# Patient Record
Sex: FEMALE | Race: WHITE | NOT HISPANIC OR LATINO | Employment: OTHER | URBAN - NONMETROPOLITAN AREA
[De-identification: names, ages, dates, MRNs, and addresses within clinical notes are randomized per-mention and may not be internally consistent; named-entity substitution may affect disease eponyms.]

---

## 2022-03-02 ENCOUNTER — LAB (OUTPATIENT)
Dept: LAB | Facility: HOSPITAL | Age: 62
End: 2022-03-02

## 2022-03-02 ENCOUNTER — OFFICE VISIT (OUTPATIENT)
Dept: INTERNAL MEDICINE | Facility: CLINIC | Age: 62
End: 2022-03-02

## 2022-03-02 VITALS
WEIGHT: 148.3 LBS | OXYGEN SATURATION: 98 % | HEART RATE: 72 BPM | SYSTOLIC BLOOD PRESSURE: 130 MMHG | TEMPERATURE: 97.4 F | BODY MASS INDEX: 26.28 KG/M2 | HEIGHT: 63 IN | DIASTOLIC BLOOD PRESSURE: 62 MMHG | RESPIRATION RATE: 16 BRPM

## 2022-03-02 DIAGNOSIS — I10 ESSENTIAL HYPERTENSION: Primary | ICD-10-CM

## 2022-03-02 DIAGNOSIS — Z00.00 HEALTHCARE MAINTENANCE: ICD-10-CM

## 2022-03-02 DIAGNOSIS — Z11.59 ENCOUNTER FOR HEPATITIS C SCREENING TEST FOR LOW RISK PATIENT: ICD-10-CM

## 2022-03-02 DIAGNOSIS — I10 ESSENTIAL HYPERTENSION: ICD-10-CM

## 2022-03-02 DIAGNOSIS — E78.5 HYPERLIPIDEMIA, UNSPECIFIED HYPERLIPIDEMIA TYPE: ICD-10-CM

## 2022-03-02 LAB
ALBUMIN SERPL-MCNC: 4.5 G/DL (ref 3.5–5.2)
ALBUMIN/GLOB SERPL: 2 G/DL
ALP SERPL-CCNC: 83 U/L (ref 39–117)
ALT SERPL W P-5'-P-CCNC: 15 U/L (ref 1–33)
ANION GAP SERPL CALCULATED.3IONS-SCNC: 9.4 MMOL/L (ref 5–15)
AST SERPL-CCNC: 22 U/L (ref 1–32)
BILIRUB SERPL-MCNC: 0.4 MG/DL (ref 0–1.2)
BUN SERPL-MCNC: 13 MG/DL (ref 8–23)
BUN/CREAT SERPL: 13.4 (ref 7–25)
CALCIUM SPEC-SCNC: 9.4 MG/DL (ref 8.6–10.5)
CHLORIDE SERPL-SCNC: 104 MMOL/L (ref 98–107)
CHOLEST SERPL-MCNC: 265 MG/DL (ref 0–200)
CO2 SERPL-SCNC: 27.6 MMOL/L (ref 22–29)
CREAT SERPL-MCNC: 0.97 MG/DL (ref 0.57–1)
DEPRECATED RDW RBC AUTO: 46.1 FL (ref 37–54)
EGFRCR SERPLBLD CKD-EPI 2021: 65.8 ML/MIN/1.73
ERYTHROCYTE [DISTWIDTH] IN BLOOD BY AUTOMATED COUNT: 13.1 % (ref 12.3–15.4)
GLOBULIN UR ELPH-MCNC: 2.3 GM/DL
GLUCOSE SERPL-MCNC: 109 MG/DL (ref 65–99)
HBA1C MFR BLD: 5.1 % (ref 4.8–5.6)
HCT VFR BLD AUTO: 38.9 % (ref 34–46.6)
HCV AB SER DONR QL: NORMAL
HDLC SERPL-MCNC: 85 MG/DL (ref 40–60)
HGB BLD-MCNC: 12.7 G/DL (ref 12–15.9)
LDLC SERPL CALC-MCNC: 156 MG/DL (ref 0–100)
LDLC/HDLC SERPL: 1.79 {RATIO}
MCH RBC QN AUTO: 31.3 PG (ref 26.6–33)
MCHC RBC AUTO-ENTMCNC: 32.6 G/DL (ref 31.5–35.7)
MCV RBC AUTO: 95.8 FL (ref 79–97)
PLATELET # BLD AUTO: 193 10*3/MM3 (ref 140–450)
PMV BLD AUTO: 11.5 FL (ref 6–12)
POTASSIUM SERPL-SCNC: 4.6 MMOL/L (ref 3.5–5.2)
PROT SERPL-MCNC: 6.8 G/DL (ref 6–8.5)
RBC # BLD AUTO: 4.06 10*6/MM3 (ref 3.77–5.28)
SODIUM SERPL-SCNC: 141 MMOL/L (ref 136–145)
TRIGL SERPL-MCNC: 138 MG/DL (ref 0–150)
VLDLC SERPL-MCNC: 24 MG/DL (ref 5–40)
WBC NRBC COR # BLD: 3.92 10*3/MM3 (ref 3.4–10.8)

## 2022-03-02 PROCEDURE — 85027 COMPLETE CBC AUTOMATED: CPT

## 2022-03-02 PROCEDURE — 86803 HEPATITIS C AB TEST: CPT

## 2022-03-02 PROCEDURE — 83036 HEMOGLOBIN GLYCOSYLATED A1C: CPT

## 2022-03-02 PROCEDURE — 99204 OFFICE O/P NEW MOD 45 MIN: CPT | Performed by: INTERNAL MEDICINE

## 2022-03-02 PROCEDURE — 80053 COMPREHEN METABOLIC PANEL: CPT

## 2022-03-02 PROCEDURE — 80061 LIPID PANEL: CPT

## 2022-03-02 PROCEDURE — 36415 COLL VENOUS BLD VENIPUNCTURE: CPT

## 2022-03-02 RX ORDER — LISINOPRIL 10 MG/1
10 TABLET ORAL 2 TIMES DAILY
COMMUNITY

## 2022-03-02 RX ORDER — EZETIMIBE 10 MG/1
10 TABLET ORAL DAILY
COMMUNITY
End: 2022-07-28 | Stop reason: SDUPTHER

## 2022-03-02 RX ORDER — IBUPROFEN 200 MG
600 TABLET ORAL AS NEEDED
COMMUNITY

## 2022-03-02 RX ORDER — CARVEDILOL 25 MG/1
12.5 TABLET ORAL 2 TIMES DAILY
COMMUNITY

## 2022-03-02 NOTE — PATIENT INSTRUCTIONS
"https://www.nhlbi.nih.gov/files/docs/public/heart/dash_brief.pdf\">   DASH Eating Plan  DASH stands for Dietary Approaches to Stop Hypertension. The DASH eating plan is a healthy eating plan that has been shown to:  · Reduce high blood pressure (hypertension).  · Reduce your risk for type 2 diabetes, heart disease, and stroke.  · Help with weight loss.  What are tips for following this plan?  Reading food labels  · Check food labels for the amount of salt (sodium) per serving. Choose foods with less than 5 percent of the Daily Value of sodium. Generally, foods with less than 300 milligrams (mg) of sodium per serving fit into this eating plan.  · To find whole grains, look for the word \"whole\" as the first word in the ingredient list.  Shopping  · Buy products labeled as \"low-sodium\" or \"no salt added.\"  · Buy fresh foods. Avoid canned foods and pre-made or frozen meals.  Cooking  · Avoid adding salt when cooking. Use salt-free seasonings or herbs instead of table salt or sea salt. Check with your health care provider or pharmacist before using salt substitutes.  · Do not rivera foods. Cook foods using healthy methods such as baking, boiling, grilling, roasting, and broiling instead.  · Cook with heart-healthy oils, such as olive, canola, avocado, soybean, or sunflower oil.  Meal planning    · Eat a balanced diet that includes:  ? 4 or more servings of fruits and 4 or more servings of vegetables each day. Try to fill one-half of your plate with fruits and vegetables.  ? 6-8 servings of whole grains each day.  ? Less than 6 oz (170 g) of lean meat, poultry, or fish each day. A 3-oz (85-g) serving of meat is about the same size as a deck of cards. One egg equals 1 oz (28 g).  ? 2-3 servings of low-fat dairy each day. One serving is 1 cup (237 mL).  ? 1 serving of nuts, seeds, or beans 5 times each week.  ? 2-3 servings of heart-healthy fats. Healthy fats called omega-3 fatty acids are found in foods such as walnuts, " flaxseeds, fortified milks, and eggs. These fats are also found in cold-water fish, such as sardines, salmon, and mackerel.  · Limit how much you eat of:  ? Canned or prepackaged foods.  ? Food that is high in trans fat, such as some fried foods.  ? Food that is high in saturated fat, such as fatty meat.  ? Desserts and other sweets, sugary drinks, and other foods with added sugar.  ? Full-fat dairy products.  · Do not salt foods before eating.  · Do not eat more than 4 egg yolks a week.  · Try to eat at least 2 vegetarian meals a week.  · Eat more home-cooked food and less restaurant, buffet, and fast food.    Lifestyle  · When eating at a restaurant, ask that your food be prepared with less salt or no salt, if possible.  · If you drink alcohol:  ? Limit how much you use to:  § 0-1 drink a day for women who are not pregnant.  § 0-2 drinks a day for men.  ? Be aware of how much alcohol is in your drink. In the [place] one drink equals one 12 oz bottle of beer (355 mL), one 5 oz glass of wine (148 mL), or one 1½ oz glass of hard liquor (44 mL).  General information  · Avoid eating more than 2,300 mg of salt a day. If you have hypertension, you may need to reduce your sodium intake to 1,500 mg a day.  · Work with your health care provider to maintain a healthy body weight or to lose weight. Ask what an ideal weight is for you.  · Get at least 30 minutes of exercise that causes your heart to beat faster (aerobic exercise) most days of the week. Activities may include walking, swimming, or biking.  · Work with your health care provider or dietitian to adjust your eating plan to your individual calorie needs.  What foods should I eat?  Fruits  All fresh, dried, or frozen fruit. Canned fruit in natural juice (without added sugar).  Vegetables  Fresh or frozen vegetables (raw, steamed, roasted, or grilled). Low-sodium or reduced-sodium tomato and vegetable juice. Low-sodium or reduced-sodium tomato sauce and tomato paste.  Low-sodium or reduced-sodium canned vegetables.  Grains  Whole-grain or whole-wheat bread. Whole-grain or whole-wheat pasta. Brown rice. Oatmeal. Quinoa. Bulgur. Whole-grain and low-sodium cereals. Silvia bread. Low-fat, low-sodium crackers. Whole-wheat flour tortillas.  Meats and other proteins  Skinless chicken or turkey. Ground chicken or turkey. Pork with fat trimmed off. Fish and seafood. Egg whites. Dried beans, peas, or lentils. Unsalted nuts, nut butters, and seeds. Unsalted canned beans. Lean cuts of beef with fat trimmed off. Low-sodium, lean precooked or cured meat, such as sausages or meat loaves.  Dairy  Low-fat (1%) or fat-free (skim) milk. Reduced-fat, low-fat, or fat-free cheeses. Nonfat, low-sodium ricotta or cottage cheese. Low-fat or nonfat yogurt. Low-fat, low-sodium cheese.  Fats and oils  Soft margarine without trans fats. Vegetable oil. Reduced-fat, low-fat, or light mayonnaise and salad dressings (reduced-sodium). Canola, safflower, olive, avocado, soybean, and sunflower oils. Avocado.  Seasonings and condiments  Herbs. Spices. Seasoning mixes without salt.  Other foods  Unsalted popcorn and pretzels. Fat-free sweets.  The items listed above may not be a complete list of foods and beverages you can eat. Contact a dietitian for more information.  What foods should I avoid?  Fruits  Canned fruit in a light or heavy syrup. Fried fruit. Fruit in cream or butter sauce.  Vegetables  Creamed or fried vegetables. Vegetables in a cheese sauce. Regular canned vegetables (not low-sodium or reduced-sodium). Regular canned tomato sauce and paste (not low-sodium or reduced-sodium). Regular tomato and vegetable juice (not low-sodium or reduced-sodium). Pickles. Olives.  Grains  Baked goods made with fat, such as croissants, muffins, or some breads. Dry pasta or rice meal packs.  Meats and other proteins  Fatty cuts of meat. Ribs. Fried meat. Sanchez. Bologna, salami, and other precooked or cured meats, such as  sausages or meat loaves. Fat from the back of a pig (fatback). Bratwurst. Salted nuts and seeds. Canned beans with added salt. Canned or smoked fish. Whole eggs or egg yolks. Chicken or turkey with skin.  Dairy  Whole or 2% milk, cream, and half-and-half. Whole or full-fat cream cheese. Whole-fat or sweetened yogurt. Full-fat cheese. Nondairy creamers. Whipped toppings. Processed cheese and cheese spreads.  Fats and oils  Butter. Stick margarine. Lard. Shortening. Ghee. Sanchez fat. Tropical oils, such as coconut, palm kernel, or palm oil.  Seasonings and condiments  Onion salt, garlic salt, seasoned salt, table salt, and sea salt. Worcestershire sauce. Tartar sauce. Barbecue sauce. Teriyaki sauce. Soy sauce, including reduced-sodium. Steak sauce. Canned and packaged gravies. Fish sauce. Oyster sauce. Cocktail sauce. Store-bought horseradish. Ketchup. Mustard. Meat flavorings and tenderizers. Bouillon cubes. Hot sauces. Pre-made or packaged marinades. Pre-made or packaged taco seasonings. Relishes. Regular salad dressings.  Other foods  Salted popcorn and pretzels.  The items listed above may not be a complete list of foods and beverages you should avoid. Contact a dietitian for more information.  Where to find more information  · National Heart, Lung, and Blood Gay: www.nhlbi.nih.gov  · American Heart Association: www.heart.org  · Academy of Nutrition and Dietetics: www.eatright.org  · National Kidney Foundation: www.kidney.org  Summary  · The DASH eating plan is a healthy eating plan that has been shown to reduce high blood pressure (hypertension). It may also reduce your risk for type 2 diabetes, heart disease, and stroke.  · When on the DASH eating plan, aim to eat more fresh fruits and vegetables, whole grains, lean proteins, low-fat dairy, and heart-healthy fats.  · With the DASH eating plan, you should limit salt (sodium) intake to 2,300 mg a day. If you have hypertension, you may need to reduce your  sodium intake to 1,500 mg a day.  · Work with your health care provider or dietitian to adjust your eating plan to your individual calorie needs.  This information is not intended to replace advice given to you by your health care provider. Make sure you discuss any questions you have with your health care provider.  Document Revised: 11/20/2020 Document Reviewed: 11/20/2020  [employer] Patient Education © 2021 [employer]    Cooking With Less Salt  Cooking with less salt is one way to reduce the amount of sodium you get from food. Sodium is one of the elements that make up salt. It is found naturally in foods and is also added to certain foods. Depending on your condition and overall health, your health care provider or dietitian may recommend that you reduce your sodium intake. Most people should have less than 2,300 milligrams (mg) of sodium each day. If you have high blood pressure (hypertension), you may need to limit your sodium to 1,500 mg each day. Follow the tips below to help reduce your sodium intake.  What are tips for eating less sodium?  Reading food labels    · Check the food label before buying or using packaged ingredients. Always check the label for the serving size and sodium content.  · Look for products with no more than 140 mg of sodium in one serving.  · Check the % Daily Value column to see what percent of the daily recommended amount of sodium is provided in one serving of the product. Foods with 5% or less in this column are considered low in sodium. Foods with 20% or higher are considered high in sodium.  · Do not choose foods with salt as one of the first three ingredients on the ingredients list. If salt is one of the first three ingredients, it usually means the item is high in sodium.    Shopping  · Buy sodium-free or low-sodium products. Look for the following words on food labels:  ? Low-sodium.  ? Sodium-free.  ? Reduced-sodium.  ? No salt added.  ? Unsalted.  · Always check the  "sodium content even if foods are labeled as low-sodium or no salt added.  · Buy fresh foods.  Cooking  · Use herbs, seasonings without salt, and spices as substitutes for salt.  · Use sodium-free baking soda when baking.  · Haysi, braise, or roast foods to add flavor with less salt.  · Avoid adding salt to pasta, rice, or hot cereals.  · Drain and rinse canned vegetables, beans, and meat before use.  · Avoid adding salt when cooking sweets and desserts.  · Cook with low-sodium ingredients.  What foods are high in sodium?  Vegetables  Regular canned vegetables (not low-sodium or reduced-sodium). Sauerkraut, pickled vegetables, and relishes. Olives. French fries. Onion rings. Regular canned tomato sauce and paste. Regular tomato and vegetable juice. Frozen vegetables in sauces.  Grains  Instant hot cereals. Bread stuffing, pancake, and biscuit mixes. Croutons. Seasoned rice or pasta mixes. Noodle soup cups. Boxed or frozen macaroni and cheese. Regular salted crackers. Self-rising flour. Rolls. Bagels. Flour tortillas and wraps.  Meats and other proteins  Meat or fish that is salted, canned, smoked, cured, spiced, or pickled. This includes soliz, ham, sausages, hot dogs, corned beef, chipped beef, meat loaves, salt pork, jerky, pickled herring, anchovies, regular canned tuna, and sardines. Salted nuts.  Dairy  Processed cheese and cheese spreads. Cheese curds. Blue cheese. Feta cheese. String cheese. Regular cottage cheese. Buttermilk. Canned milk.  The items listed above may not be a complete list of foods high in sodium. Actual amounts of sodium may be different depending on processing. Contact a dietitian for more information.  What foods are low in sodium?  Fruits  Fresh, frozen, or canned fruit with no sauce added. Fruit juice.  Vegetables  Fresh or frozen vegetables with no sauce added. \"No salt added\" canned vegetables. \"No salt added\" tomato sauce and paste. Low-sodium or reduced-sodium tomato and vegetable " juice.  Grains  Noodles, pasta, quinoa, rice. Shredded or puffed wheat or puffed rice. Regular or quick oats (not instant). Low-sodium crackers. Low-sodium bread. Whole-grain bread and whole-grain pasta. Unsalted popcorn.  Meats and other proteins  Fresh or frozen whole meats, poultry (not injected with sodium), and fish with no sauce added. Unsalted nuts. Dried peas, beans, and lentils without added salt. Unsalted canned beans. Eggs. Unsalted nut butters. Low-sodium canned tuna or chicken.  Dairy  Milk. Soy milk. Yogurt. Low-sodium cheeses, such as Swiss, [place], mozzarella, and ricotta. Sherbet or ice cream (keep to ½ cup per serving). Cream cheese.  Fats and oils  Unsalted butter or margarine.  Other foods  Homemade pudding. Sodium-free baking soda and baking powder. Herbs and spices. Low-sodium seasoning mixes.  Beverages  Coffee and tea. Carbonated beverages.  The items listed above may not be a complete list of foods low in sodium. Actual amounts of sodium may be different depending on processing. Contact a dietitian for more information.  What are some salt alternatives when cooking?  The following are herbs, seasonings, and spices that can be used instead of salt to flavor your food. Herbs should be fresh or dried. Do not choose packaged mixes. Next to the name of the herb, spice, or seasoning are some examples of foods you can pair it with.  Herbs  · Bay leaves - Soups, meat and vegetable dishes, and spaghetti sauce.  · Basil - Italian dishes, soups, pasta, and fish dishes.  · Cilantro - Meat, poultry, and vegetable dishes.  · Chili powder - Marinades and Mexican dishes.  · Chives - Salad dressings and potato dishes.  · Cumin - Mexican dishes, couscous, and meat dishes.  · Dill - Fish dishes, sauces, and salads.  · Fennel - Meat and vegetable dishes, breads, and cookies.  · Garlic (do not use garlic salt) - Italian dishes, meat dishes, salad dressings, and sauces.  · Marjoram - Soups, potato dishes,  and meat dishes.  · Oregano - Pizza and spaghetti sauce.  · Parsley - Salads, soups, pasta, and meat dishes.  · Kelly - Italian dishes, salad dressings, soups, and red meats.  · Saffron - Fish dishes, pasta, and some poultry dishes.  · Bharath - Stuffings and sauces.  · Tarragon - Fish and poultry dishes.  · Thyme - Stuffing, meat, and fish dishes.  Seasonings  · Lemon juice - Fish dishes, poultry dishes, vegetables, and salads.  · Vinegar - Salad dressings, vegetables, and fish dishes.  Spices  · Cinnamon - Sweet dishes, such as cakes, cookies, and puddings.  · Cloves - Gingerbread, puddings, and marinades for meats.  · Rosales - Vegetable dishes, fish and poultry dishes, and stir-rivera dishes.  · Neda - Vegetable dishes, fish dishes, and stir-rivera dishes.  · Nutmeg - Pasta, vegetables, poultry, fish dishes, and custard.  Summary  · Cooking with less salt is one way to reduce the amount of sodium that you get from food.  · Buy sodium-free or low-sodium products.  · Check the food label before using or buying packaged ingredients.  · Use herbs, seasonings without salt, and spices as substitutes for salt in foods.  This information is not intended to replace advice given to you by your health care provider. Make sure you discuss any questions you have with your health care provider.  Document Revised: 12/09/2020 Document Reviewed: 12/09/2020  [employer] Patient Education © 2021 [employer]    Hypertension, Adult  High blood pressure (hypertension) is when the force of blood pumping through the arteries is too strong. The arteries are the blood vessels that carry blood from the heart throughout the body. Hypertension forces the heart to work harder to pump blood and may cause arteries to become narrow or stiff. Untreated or uncontrolled hypertension can cause a heart attack, heart failure, a stroke, kidney disease, and other problems.  A blood pressure reading consists of a higher number over a lower number. Ideally,  "your blood pressure should be below 120/80. The first (\"top\") number is called the systolic pressure. It is a measure of the pressure in your arteries as your heart beats. The second (\"bottom\") number is called the diastolic pressure. It is a measure of the pressure in your arteries as the heart relaxes.  What are the causes?  The exact cause of this condition is not known. There are some conditions that result in or are related to high blood pressure.  What increases the risk?  Some risk factors for high blood pressure are under your control. The following factors may make you more likely to develop this condition:  · Smoking.  · Having type 2 diabetes mellitus, high cholesterol, or both.  · Not getting enough exercise or physical activity.  · Being overweight.  · Having too much fat, sugar, calories, or salt (sodium) in your diet.  · Drinking too much alcohol.  Some risk factors for high blood pressure may be difficult or impossible to change. Some of these factors include:  · Having chronic kidney disease.  · Having a family history of high blood pressure.  · Age. Risk increases with age.  · Race. You may be at higher risk if you are .  · Gender. Men are at higher risk than women before age 45. After age 65, women are at higher risk than men.  · Having obstructive sleep apnea.  · Stress.  What are the signs or symptoms?  High blood pressure may not cause symptoms. Very high blood pressure (hypertensive crisis) may cause:  · Headache.  · Anxiety.  · Shortness of breath.  · Nosebleed.  · Nausea and vomiting.  · Vision changes.  · Severe chest pain.  · Seizures.  How is this diagnosed?  This condition is diagnosed by measuring your blood pressure while you are seated, with your arm resting on a flat surface, your legs uncrossed, and your feet flat on the floor. The cuff of the blood pressure monitor will be placed directly against the skin of your upper arm at the level of your heart. It should be " measured at least twice using the same arm. Certain conditions can cause a difference in blood pressure between your right and left arms.  Certain factors can cause blood pressure readings to be lower or higher than normal for a short period of time:  · When your blood pressure is higher when you are in a health care provider's office than when you are at home, this is called white coat hypertension. Most people with this condition do not need medicines.  · When your blood pressure is higher at home than when you are in a health care provider's office, this is called masked hypertension. Most people with this condition may need medicines to control blood pressure.  If you have a high blood pressure reading during one visit or you have normal blood pressure with other risk factors, you may be asked to:  · Return on a different day to have your blood pressure checked again.  · Monitor your blood pressure at home for 1 week or longer.  If you are diagnosed with hypertension, you may have other blood or imaging tests to help your health care provider understand your overall risk for other conditions.  How is this treated?  This condition is treated by making healthy lifestyle changes, such as eating healthy foods, exercising more, and reducing your alcohol intake. Your health care provider may prescribe medicine if lifestyle changes are not enough to get your blood pressure under control, and if:  · Your systolic blood pressure is above 130.  · Your diastolic blood pressure is above 80.  Your personal target blood pressure may vary depending on your medical conditions, your age, and other factors.  Follow these instructions at home:  Eating and drinking    · Eat a diet that is high in fiber and potassium, and low in sodium, added sugar, and fat. An example eating plan is called the DASH (Dietary Approaches to Stop Hypertension) diet. To eat this way:  ? Eat plenty of fresh fruits and vegetables. Try to fill one half of  your plate at each meal with fruits and vegetables.  ? Eat whole grains, such as whole-wheat pasta, brown rice, or whole-grain bread. Fill about one fourth of your plate with whole grains.  ? Eat or drink low-fat dairy products, such as skim milk or low-fat yogurt.  ? Avoid fatty cuts of meat, processed or cured meats, and poultry with skin. Fill about one fourth of your plate with lean proteins, such as fish, chicken without skin, beans, eggs, or tofu.  ? Avoid pre-made and processed foods. These tend to be higher in sodium, added sugar, and fat.  · Reduce your daily sodium intake. Most people with hypertension should eat less than 1,500 mg of sodium a day.  · Do not drink alcohol if:  ? Your health care provider tells you not to drink.  ? You are pregnant, may be pregnant, or are planning to become pregnant.  · If you drink alcohol:  ? Limit how much you use to:  § 0-1 drink a day for women.  § 0-2 drinks a day for men.  ? Be aware of how much alcohol is in your drink. In the [place] one drink equals one 12 oz bottle of beer (355 mL), one 5 oz glass of wine (148 mL), or one 1½ oz glass of hard liquor (44 mL).    Lifestyle    · Work with your health care provider to maintain a healthy body weight or to lose weight. Ask what an ideal weight is for you.  · Get at least 30 minutes of exercise most days of the week. Activities may include walking, swimming, or biking.  · Include exercise to strengthen your muscles (resistance exercise), such as Pilates or lifting weights, as part of your weekly exercise routine. Try to do these types of exercises for 30 minutes at least 3 days a week.  · Do not use any products that contain nicotine or tobacco, such as cigarettes, e-cigarettes, and chewing tobacco. If you need help quitting, ask your health care provider.  · Monitor your blood pressure at home as told by your health care provider.  · Keep all follow-up visits as told by your health care provider. This is  important.    Medicines  · Take over-the-counter and prescription medicines only as told by your health care provider. Follow directions carefully. Blood pressure medicines must be taken as prescribed.  · Do not skip doses of blood pressure medicine. Doing this puts you at risk for problems and can make the medicine less effective.  · Ask your health care provider about side effects or reactions to medicines that you should watch for.  Contact a health care provider if you:  · Think you are having a reaction to a medicine you are taking.  · Have headaches that keep coming back (recurring).  · Feel dizzy.  · Have swelling in your ankles.  · Have trouble with your vision.  Get help right away if you:  · Develop a severe headache or confusion.  · Have unusual weakness or numbness.  · Feel faint.  · Have severe pain in your chest or abdomen.  · Vomit repeatedly.  · Have trouble breathing.  Summary  · Hypertension is when the force of blood pumping through your arteries is too strong. If this condition is not controlled, it may put you at risk for serious complications.  · Your personal target blood pressure may vary depending on your medical conditions, your age, and other factors. For most people, a normal blood pressure is less than 120/80.  · Hypertension is treated with lifestyle changes, medicines, or a combination of both. Lifestyle changes include losing weight, eating a healthy, low-sodium diet, exercising more, and limiting alcohol.  This information is not intended to replace advice given to you by your health care provider. Make sure you discuss any questions you have with your health care provider.  Document Revised: 08/28/2019 Document Reviewed: 08/28/2019  [employer] Patient Education © 2021 [employer]

## 2022-03-02 NOTE — PROGRESS NOTES
Chief Complaint   Patient presents with   • Establish Care   • Hypertension   • Referral     Cardiology         History:  Melony Rincon is a 61 y.o. female who presents today for evaluation of the above problems.    She presents today to establish care.  She has past medical history for hypertension, hyperlipidemia, reflux, coronary artery disease among others.    She saw her primary care doctor last in August and is overdue for some blood work.    She takes Zetia for hyperlipidemia, and is on lisinopril and carvedilol for hypertension.    She has had issues with controlling her blood pressure and was referred to cardiology in [place] for hypertension.  She has had spikes of her blood pressure up to 180 systolic.  She then saw cardiology and had an echocardiogram on October 20, 2020 which reviewed.  She continues with difficulty controlling her blood pressure all the time.  She is currently on lisinopril 20 mg in the morning and 10 mg at night.  She developed hypotension with Coreg 25 mg twice a day.  She then cut back her Coreg to 12.5 mg twice a day, but her blood pressure increased with this regimen.  She is now on Coreg 25 mg in the morning and 12.5 mg in the evening.    She was previously on Norvasc.  Hydrochlorothiazide caused her leg cramps to worsen    She does report craving salty food, specifically chips.    She had a lung CT last fall showing mild COPD.  She had pulmonary function tests 3 to 4 years ago and it is reported only mild abnormalities.    Her last mammogram was January 2021.  She has got some issues that she wants to discuss with gynecology and a referral was placed today    Her last colonoscopy was April 2021 by Dr. Salmeron at [facility] in [facility] and we will try to get records.    Her previous PCP was Dr. Roddy Ricks and we will also try to get records.  His office is in [facility] and he is associated with [facility].  Michi KENT   HPI      ROS:  Review of Systems  "  Constitutional: Negative.    Respiratory: Positive for cough (intermittent dry). Negative for shortness of breath.    Cardiovascular: Negative.    Gastrointestinal: Negative.          Current Outpatient Medications:   •  carvedilol (COREG) 25 MG tablet, Take 25 mg by mouth., Disp: , Rfl:   •  ezetimibe (ZETIA) 10 MG tablet, Take 10 mg by mouth Daily., Disp: , Rfl:   •  ibuprofen (ADVIL,MOTRIN) 200 MG tablet, Take 600 mg by mouth As Needed for Mild Pain ., Disp: , Rfl:   •  LANSOPRAZOLE PO, Take 1 tablet by mouth Daily., Disp: , Rfl:   •  lisinopril (PRINIVIL,ZESTRIL) 10 MG tablet, Take 10 mg by mouth., Disp: , Rfl:     No results found for: GLUCOSE, BUN, CREATININE, EGFRIFNONA, EGFRIFAFRI, BCR, K, CO2, CALCIUM, PROTENTOTREF, ALBUMIN, LABIL2, BILIRUBIN, AST, ALT    No results found for: WBC, RBC, HGB, HCT, MCV, MCH, MCHC, RDW, RDWSD, MPV, PLT, NEUTRORELPCT, LYMPHORELPCT, MONORELPCT, EOSRELPCT, BASORELPCT, AUTOIGPER, NEUTROABS, LYMPHSABS, MONOSABS, EOSABS, BASOSABS, AUTOIGNUM, NRBC      OBJECTIVE:  Visit Vitals  /62 (BP Location: Left arm, Patient Position: Sitting, Cuff Size: Adult)   Pulse 72   Temp 97.4 °F (36.3 °C) (Oral)   Resp 16   Ht 160 cm (63\")   Wt 67.3 kg (148 lb 4.8 oz)   SpO2 98%   BMI 26.27 kg/m²      Physical Exam  Vitals and nursing note reviewed.   Constitutional:       General: She is not in acute distress.     Appearance: Normal appearance. She is well-developed and normal weight. She is not diaphoretic.      Comments: Pleasant     HENT:      Head: Normocephalic and atraumatic.   Eyes:      Pupils: Pupils are equal, round, and reactive to light.   Neck:      Thyroid: No thyromegaly.      Trachea: Phonation normal.   Cardiovascular:      Rate and Rhythm: Normal rate and regular rhythm.      Heart sounds: No murmur heard.      Pulmonary:      Effort: No respiratory distress.   Abdominal:      General: Abdomen is flat.      Palpations: Abdomen is soft.      Tenderness: There is no abdominal " tenderness.   Musculoskeletal:      Right lower leg: No edema.      Left lower leg: No edema.   Skin:     Coloration: Skin is not pale.      Findings: No erythema.   Neurological:      Mental Status: She is alert.   Psychiatric:         Behavior: Behavior normal.         Thought Content: Thought content normal.         Judgment: Judgment normal.         Assessment/Plan    Diagnoses and all orders for this visit:    1. Essential hypertension (Primary)  -     Comprehensive Metabolic Panel; Future  -     Ambulatory Referral to Cardiology    2. Hyperlipidemia, unspecified hyperlipidemia type  -     Lipid Panel; Future    3. Encounter for hepatitis C screening test for low risk patient  -     Hepatitis C Antibody; Future    4. Healthcare maintenance  -     CBC (No Diff); Future  -     Comprehensive Metabolic Panel; Future  -     Hemoglobin A1c; Future  -     Lipid Panel; Future  -     Ambulatory Referral to Gynecology      Her blood pressure is well controlled today and I would not make any changes at this time.  However, she has had sporadic increases in her blood pressure.  I have asked her to keep an eye on her salt intake and see if there is any correlation between increased salt intake, and hypertension.  She would like to see  for a consultation for her blood pressure.    She states that her previous primary care doctor stated she had a heart attack in the past.  She was not aware of ever having 1.  She has not had ischemic work-up either.  She did have a 2D echo October 20, 2020 in [place]    She is due for some blood work which I ordered today.  Further work-up or management based on those results.    I have also sent a referral over to gynecology    Follow-up in 1 year for annual physical or sooner if indicated.    Return in about 1 year (around 3/2/2023) for Annual physical.      VICTORIA Louis MD  10:30 CST  3/2/2022

## 2022-03-03 ENCOUNTER — PATIENT ROUNDING (BHMG ONLY) (OUTPATIENT)
Dept: INTERNAL MEDICINE | Facility: CLINIC | Age: 62
End: 2022-03-03

## 2022-03-03 NOTE — PROGRESS NOTES
MARCH 2, 2022    Hello, may I speak with Melony Rincon?    My name is Izzy Morales     I am  with [facility]  [address], [address]  [place] [address] [phone].    Before we get started may I verify your date of birth? 1960    I am calling to officially welcome you to our practice and ask about your recent visit. Is this a good time to talk? yes    Tell me about your visit with us. What things went well?  He answered my questions and took his time.       We're always looking for ways to make our patients' experiences even better. Do you have recommendations on ways we may improve?  no    Overall were you satisfied with your first visit to our practice? yes       I appreciate you taking the time to speak with me today. Is there anything else I can do for you? no      Thank you, and have a great day.

## 2022-05-10 ENCOUNTER — OFFICE VISIT (OUTPATIENT)
Dept: OBSTETRICS AND GYNECOLOGY | Facility: CLINIC | Age: 62
End: 2022-05-10

## 2022-05-10 VITALS
HEIGHT: 63 IN | DIASTOLIC BLOOD PRESSURE: 68 MMHG | BODY MASS INDEX: 25.52 KG/M2 | SYSTOLIC BLOOD PRESSURE: 96 MMHG | WEIGHT: 144 LBS

## 2022-05-10 DIAGNOSIS — Z12.31 ENCOUNTER FOR SCREENING MAMMOGRAM FOR BREAST CANCER: ICD-10-CM

## 2022-05-10 DIAGNOSIS — Z01.419 WELL WOMAN EXAM WITH ROUTINE GYNECOLOGICAL EXAM: Primary | ICD-10-CM

## 2022-05-10 DIAGNOSIS — N89.8 VAGINAL DRYNESS: ICD-10-CM

## 2022-05-10 DIAGNOSIS — Z13.820 ENCOUNTER FOR SCREENING FOR OSTEOPOROSIS: ICD-10-CM

## 2022-05-10 PROCEDURE — 87624 HPV HI-RISK TYP POOLED RSLT: CPT | Performed by: NURSE PRACTITIONER

## 2022-05-10 PROCEDURE — G0123 SCREEN CERV/VAG THIN LAYER: HCPCS | Performed by: NURSE PRACTITIONER

## 2022-05-10 PROCEDURE — 99386 PREV VISIT NEW AGE 40-64: CPT | Performed by: NURSE PRACTITIONER

## 2022-05-10 NOTE — PROGRESS NOTES
"Ji Rincon is a 62 y.o. female    Patient is here today as a new patient to establish care.  She has moved here from the [place] area.    Gynecologic Exam  The patient's pertinent negatives include no pelvic pain, vaginal bleeding or vaginal discharge. The patient is experiencing no pain. Pertinent negatives include no abdominal pain, anorexia, back pain, chills, constipation, diarrhea, discolored urine, dysuria, fever, flank pain, frequency, headaches, hematuria, joint pain, joint swelling, nausea, painful intercourse, rash, sore throat, urgency or vomiting. She is sexually active. She is postmenopausal.         BP 96/68   Ht 160 cm (62.99\")   Wt 65.3 kg (144 lb)   LMP  (LMP Unknown)   Breastfeeding No   BMI 25.51 kg/m²     Outpatient Encounter Medications as of 5/10/2022   Medication Sig Dispense Refill   • carvedilol (COREG) 25 MG tablet Take 25 mg by mouth.     • ezetimibe (ZETIA) 10 MG tablet Take 10 mg by mouth Daily.     • ibuprofen (ADVIL,MOTRIN) 200 MG tablet Take 600 mg by mouth As Needed for Mild Pain .     • LANSOPRAZOLE PO Take 1 tablet by mouth Daily.     • lisinopril (PRINIVIL,ZESTRIL) 10 MG tablet Take 10 mg by mouth.       No facility-administered encounter medications on file as of 5/10/2022.       Surgical History  Past Surgical History:   Procedure Laterality Date   • COLONOSCOPY  2021   • TUBAL ABDOMINAL LIGATION     • WISDOM TOOTH EXTRACTION         Family History  Family History   Problem Relation Age of Onset   • Prostate cancer Father    • Alcohol abuse Father    • Cancer Father    • Diabetes Father    • Heart attack Father    • Hypertension Father    • Heart attack Mother    • Hypertension Mother    • Alcohol abuse Brother    • Cancer Brother    • Hypertension Brother    • Cancer Brother    • Hypertension Brother    • Breast cancer Sister 59   • Cancer Sister    • Hypertension Sister    • Ovarian cancer Paternal Grandmother 65   • Cancer Paternal Grandmother    • " Diabetes Maternal Grandmother    • Stroke Maternal Grandmother    • Alcohol abuse Maternal Grandfather    • Breast cancer Cousin 55   • Breast cancer Cousin 55   • Breast cancer Cousin 58   • Uterine cancer Neg Hx    • Colon cancer Neg Hx    • Melanoma Neg Hx        The following portions of the patient's history were reviewed and updated as appropriate: allergies, current medications, past family history, past medical history, past social history, past surgical history, and problem list.    Review of Systems   Constitutional: Negative for activity change, appetite change, chills, diaphoresis, fatigue, fever, unexpected weight gain and unexpected weight loss.   HENT: Negative for congestion, dental problem, drooling, ear discharge, ear pain, facial swelling, hearing loss, mouth sores, nosebleeds, postnasal drip, rhinorrhea, sinus pressure, sneezing, sore throat, swollen glands, tinnitus, trouble swallowing and voice change.    Eyes: Negative for blurred vision, double vision, photophobia, pain, discharge, redness, itching and visual disturbance.   Respiratory: Negative for apnea, cough, choking, chest tightness, shortness of breath, wheezing and stridor.    Cardiovascular: Negative for chest pain, palpitations and leg swelling.   Gastrointestinal: Negative for abdominal distention, abdominal pain, anal bleeding, anorexia, blood in stool, constipation, diarrhea, nausea, rectal pain, vomiting, GERD and indigestion.   Endocrine: Negative for cold intolerance, heat intolerance, polydipsia, polyphagia and polyuria.   Genitourinary: Negative for amenorrhea, breast discharge, breast lump, breast pain, decreased libido, decreased urine volume, difficulty urinating, dyspareunia, dysuria, flank pain, frequency, genital sores, hematuria, menstrual problem, pelvic pain, pelvic pressure, urgency, urinary incontinence, vaginal bleeding, vaginal discharge and vaginal pain.   Musculoskeletal: Negative for arthralgias, back pain,  gait problem, joint pain, joint swelling, myalgias, neck pain, neck stiffness and bursitis.   Skin: Negative for color change, dry skin and rash.   Allergic/Immunologic: Negative for environmental allergies, food allergies and immunocompromised state.   Neurological: Negative for dizziness, tremors, seizures, syncope, facial asymmetry, speech difficulty, weakness, light-headedness, numbness, headache, memory problem and confusion.   Hematological: Negative for adenopathy. Does not bruise/bleed easily.   Psychiatric/Behavioral: Negative for agitation, behavioral problems, decreased concentration, dysphoric mood, hallucinations, self-injury, sleep disturbance, suicidal ideas, negative for hyperactivity, depressed mood and stress. The patient is not nervous/anxious.        Objective   Physical Exam  Vitals and nursing note reviewed. Exam conducted with a chaperone present.   Constitutional:       General: She is not in acute distress.     Appearance: She is well-developed. She is not diaphoretic.   HENT:      Head: Normocephalic.      Right Ear: External ear normal.      Left Ear: External ear normal.      Nose: Nose normal.   Eyes:      General: No scleral icterus.        Right eye: No discharge.         Left eye: No discharge.      Conjunctiva/sclera: Conjunctivae normal.      Pupils: Pupils are equal, round, and reactive to light.   Neck:      Thyroid: No thyromegaly.      Vascular: No carotid bruit.      Trachea: No tracheal deviation.   Cardiovascular:      Rate and Rhythm: Normal rate and regular rhythm.      Heart sounds: Normal heart sounds. No murmur heard.  Pulmonary:      Effort: Pulmonary effort is normal. No respiratory distress.      Breath sounds: Normal breath sounds. No wheezing.   Chest:   Breasts: Breasts are symmetrical.      Right: Normal. No swelling, bleeding, inverted nipple, mass, nipple discharge, skin change, tenderness, axillary adenopathy or supraclavicular adenopathy.      Left: Normal. No  swelling, bleeding, inverted nipple, mass, nipple discharge, skin change, tenderness, axillary adenopathy or supraclavicular adenopathy.       Abdominal:      General: There is no distension.      Palpations: Abdomen is soft. There is no mass.      Tenderness: There is no abdominal tenderness. There is no right CVA tenderness, left CVA tenderness or guarding.      Hernia: No hernia is present. There is no hernia in the left inguinal area or right inguinal area.   Genitourinary:     General: Normal vulva.      Exam position: Lithotomy position.      Labia:         Right: No rash, tenderness, lesion or injury.         Left: No rash, tenderness, lesion or injury.       Vagina: Normal. No signs of injury and foreign body. No vaginal discharge, erythema, tenderness or bleeding.      Cervix: Normal.      Uterus: Normal. Not enlarged, not fixed and not tender.       Adnexa: Right adnexa normal and left adnexa normal.        Right: No mass, tenderness or fullness.          Left: No mass, tenderness or fullness.        Rectum: Normal. No mass.      Comments:   BSU normal  Urethral meatus  Normal  Perineum  Normal  Musculoskeletal:         General: No tenderness. Normal range of motion.      Cervical back: Normal range of motion and neck supple.   Lymphadenopathy:      Head:      Right side of head: No submental, submandibular, tonsillar, preauricular, posterior auricular or occipital adenopathy.      Left side of head: No submental, submandibular, tonsillar, preauricular, posterior auricular or occipital adenopathy.      Cervical: No cervical adenopathy.      Right cervical: No superficial, deep or posterior cervical adenopathy.     Left cervical: No superficial, deep or posterior cervical adenopathy.      Upper Body:      Right upper body: No supraclavicular, axillary or pectoral adenopathy.      Left upper body: No supraclavicular, axillary or pectoral adenopathy.      Lower Body: No right inguinal adenopathy. No left  inguinal adenopathy.   Skin:     General: Skin is warm and dry.      Findings: No bruising, erythema or rash.   Neurological:      Mental Status: She is alert and oriented to person, place, and time.      Coordination: Coordination normal.   Psychiatric:         Mood and Affect: Mood normal.         Behavior: Behavior normal.         Thought Content: Thought content normal.         Judgment: Judgment normal.         [unfilled]  Diagnoses and all orders for this visit:    1. Well woman exam with routine gynecological exam (Primary)  Normal GYN exam. Will have lab work at PCP. Encouraged SBE, pt is aware how to do self breast exam and the importance of same. Discussed weight management and importance of maintaining a healthy weight. Discussed Vitamin D intake and the importance of adequate vitamin D for both Bone Health and a healthy immune system.  Discussed Daily exercise and the importance of same, in regards to a healthy heart as well as helping to maintain her weight and improving her mental health.  BMI 25.5.  Colonoscopy is up to date.  Mammogram and bone density will be scheduled at [facility].  Pap smear is done per ASCCP guidelines.  -     Liquid-based Pap Smear, Screening    2. Encounter for screening mammogram for breast cancer  Comments:  Patient will have a mammogram at [facility].  Orders:  -     Mammo Screening Digital Tomosynthesis Bilateral With CAD; Future    3. Encounter for screening for osteoporosis  Comments:  Patient will have a bone density at [facility].  Orders:  -     DEXA Bone Density Axial; Future    4. Vaginal dryness  Comments:  Since vaginal vault is atrophic.  She is encouraged to use coconut oil daily.  She does not want any hormone replacement.         BMI is >= 25 and < 30. (Overweight) The following options were offered after discussion: exercise counseling/recommendations      Kandy Foley, APRN  5/10/2022

## 2022-05-12 LAB
GEN CATEG CVX/VAG CYTO-IMP: NORMAL
HPV I/H RISK 4 DNA CVX QL PROBE+SIG AMP: NOT DETECTED
LAB AP CASE REPORT: NORMAL
LAB AP GYN ADDITIONAL INFORMATION: NORMAL
LAB AP GYN OTHER FINDINGS: NORMAL
Lab: NORMAL
PATH INTERP SPEC-IMP: NORMAL
STAT OF ADQ CVX/VAG CYTO-IMP: NORMAL

## 2022-05-17 ENCOUNTER — HOSPITAL ENCOUNTER (OUTPATIENT)
Dept: MAMMOGRAPHY | Facility: HOSPITAL | Age: 62
Discharge: HOME | End: 2022-05-17

## 2022-05-17 ENCOUNTER — HOSPITAL ENCOUNTER (OUTPATIENT)
Dept: BONE DENSITY | Facility: HOSPITAL | Age: 62
Discharge: HOME | End: 2022-05-17

## 2022-05-17 ENCOUNTER — DOCUMENTATION (OUTPATIENT)
Dept: MAMMOGRAPHY | Facility: HOSPITAL | Age: 62
End: 2022-05-17

## 2022-05-17 DIAGNOSIS — Z80.0 FAMILY HISTORY OF PANCREATIC CANCER: Primary | ICD-10-CM

## 2022-05-17 DIAGNOSIS — Z13.820 ENCOUNTER FOR SCREENING FOR OSTEOPOROSIS: ICD-10-CM

## 2022-05-17 DIAGNOSIS — Z12.31 ENCOUNTER FOR SCREENING MAMMOGRAM FOR BREAST CANCER: ICD-10-CM

## 2022-05-17 PROCEDURE — 77063 BREAST TOMOSYNTHESIS BI: CPT

## 2022-05-17 PROCEDURE — 77067 SCR MAMMO BI INCL CAD: CPT

## 2022-05-17 PROCEDURE — 77080 DXA BONE DENSITY AXIAL: CPT

## 2022-05-17 NOTE — PROGRESS NOTES
As part of a high-risk assessment, this patient was provided a questionnaire to assess personal and family history of cancer. Patients who meet National Comprehensive Cancer Network criteria are offered genetic testing for hereditary cancer at their appointment. If this patient qualifies and consents to genetic testing, the results and management recommendations (when applicable) will be provided to the referring provider. A [facility] breast cancer risk assessment was also calculated.  Based upon the patient's answers, the TC score was calcuated as  10.6% . Women with a 20% or higher lifetime risk are recommended to pursue annual breast MRI in addition to annual mammogram, per American Cancer Society recommendations.

## 2022-06-02 ENCOUNTER — APPOINTMENT (OUTPATIENT)
Dept: LAB | Facility: HOSPITAL | Age: 62
End: 2022-06-02

## 2022-07-26 DIAGNOSIS — Z72.0 TOBACCO USE: Primary | ICD-10-CM

## 2022-07-27 ENCOUNTER — LAB (OUTPATIENT)
Dept: LAB | Facility: HOSPITAL | Age: 62
End: 2022-07-27

## 2022-07-27 ENCOUNTER — OFFICE VISIT (OUTPATIENT)
Dept: CARDIOLOGY | Facility: CLINIC | Age: 62
End: 2022-07-27

## 2022-07-27 VITALS
HEART RATE: 70 BPM | BODY MASS INDEX: 25.73 KG/M2 | DIASTOLIC BLOOD PRESSURE: 82 MMHG | OXYGEN SATURATION: 99 % | HEIGHT: 63 IN | WEIGHT: 145.2 LBS | SYSTOLIC BLOOD PRESSURE: 138 MMHG

## 2022-07-27 DIAGNOSIS — E78.2 MIXED HYPERLIPIDEMIA: ICD-10-CM

## 2022-07-27 DIAGNOSIS — Z87.891 FORMER SMOKER: ICD-10-CM

## 2022-07-27 DIAGNOSIS — Z80.0 FAMILY HISTORY OF PANCREATIC CANCER: ICD-10-CM

## 2022-07-27 DIAGNOSIS — I10 ESSENTIAL HYPERTENSION: Primary | ICD-10-CM

## 2022-07-27 PROCEDURE — 93000 ELECTROCARDIOGRAM COMPLETE: CPT | Performed by: NURSE PRACTITIONER

## 2022-07-27 PROCEDURE — 99204 OFFICE O/P NEW MOD 45 MIN: CPT | Performed by: NURSE PRACTITIONER

## 2022-07-28 RX ORDER — EZETIMIBE 10 MG/1
10 TABLET ORAL DAILY
Qty: 90 TABLET | Refills: 1 | Status: SHIPPED | OUTPATIENT
Start: 2022-07-28 | End: 2023-01-20

## 2022-08-01 ENCOUNTER — HOSPITAL ENCOUNTER (OUTPATIENT)
Dept: CT IMAGING | Facility: HOSPITAL | Age: 62
Discharge: HOME | End: 2022-08-01
Admitting: INTERNAL MEDICINE

## 2022-08-01 ENCOUNTER — DOCUMENTATION (OUTPATIENT)
Dept: CT IMAGING | Facility: HOSPITAL | Age: 62
End: 2022-08-01

## 2022-08-01 DIAGNOSIS — Z72.0 TOBACCO USE: ICD-10-CM

## 2022-08-01 PROBLEM — I10 ESSENTIAL HYPERTENSION: Status: ACTIVE | Noted: 2022-08-01

## 2022-08-01 PROBLEM — E78.2 MIXED HYPERLIPIDEMIA: Status: ACTIVE | Noted: 2022-08-01

## 2022-08-01 PROBLEM — Z87.891 FORMER SMOKER: Status: ACTIVE | Noted: 2022-08-01

## 2022-08-01 PROCEDURE — 71271 CT THORAX LUNG CANCER SCR C-: CPT

## 2022-08-01 NOTE — PROGRESS NOTES
"    Subjective:     Encounter Date:07/27/2022      Patient ID: Melony Rincon is a 62 y.o. female.    Chief Complaint: New referral for hypertension    History of Present Illness     The patient presents as a new referral from Dr. Louis for hypertension.  She established care with him on 3/2/2022 for hypertension, hyperlipidemia and acid reflux disease.    It appears she previously followed with providers in [place], [place].  Notes indicate she previously followed with cardiology in [place], [place], specifically at Dr. Escobar for assistance with blood pressure control and family history of early coronary artery disease.  Records indicate she had a 2D echocardiogram ordered to assess her LV function due to various complaints in October 2022.  See results below.  The patient denies having prior cardiac catheterizations.  She reports a remote stress test approximately 40 years ago.  She reports that she has been told based on EKG abnormalities several years ago, that she has had a prior heart attack.  Symptomatically, the patient reports that she is unaware of any acute heart attack event.    When she saw Dr. Louis On 3/2 he ordered routine lab work.  He continues Zetia for hyperlipidemia.  Notes indicate she is intolerant to statins, specifically atorvastatin due to elevated LFTs with this in the past.  She was reporting blood pressure spikes.  His note indicates that she was taking lisinopril 20 mg in the morning and 10 mg at night and due to being hypotensive on Coreg 25 mg twice daily she had cut back to 12.5 mg twice daily but her blood pressure went up so she changed to 25 mg in the morning and 12 point 5 in the evening.  She was previously intolerant to Norvasc due to \"blood pressure bottoming out\" per her report.  She also reported prior leg cramps with hydrochlorothiazide.    Prior cardiology notes indicate a diagnosis of Raynaud's phenomenon, though \"no active symptoms\" and was not taking calcium " "channel blocker at the time.    Notes indicated a prior CT suggested mild COPD.    She requested a referral to Dr. Mcgill for consultation regarding her blood pressure.  Due to EKG abnormalities suggesting a prior MI, it was suggested she may need an ischemic evaluation.    Today the patient presents to [facility] care and states that she has been feeling pretty well overall but does have numerous chronic stable complaints.  She does present an interpretation of an EKG, though not the EKG itself indicating she had septal Q waves on EKG in 2017.  She tells me she wore a Holter monitor around  or  for palpitations, but is unsure what the results were.  She reports a history of smoking but is not a current smoker.    She reports her mother developed coronary disease in her 40s and  of an MI at the age of 54.    She states approximately 25 years ago she went through a period of time when she was exhausted and was having frequent skipping and extra beats.  She states she wore a Holter monitor at that time that revealed some irregularity but did not require medication.  She tells me she continues to have chronic skips but these are significantly improved compared to when these were diagnosed years ago.  Today she reports the main issue in terms of palpitations is feeling a momentary flopping in her chest when bending over.  This has been ongoing for at least 2 years and occurs approximately weekly.  When questioned about any chest discomfort she states \"not really\" but reports some occasional chest heaviness on humid days when outside.  This was particularly bad in  but has improved this month.  She reports mild shortness of breath with overexertion that is stable and unchanged over the past several years.  She reports mild ankle swelling in the evenings.  She recalls a specific episode of bilateral arm pain/pain in the back of her arms approximately 1 year ago when resting in bed.  She denies any " recurrence of similar episodes since that time.  She also recalls an episode of acute back pain that occurred in the middle of the night approximately 8 months ago.  This resolved without specific treatment after 15 minutes.  She denies any similar recurrences since that time.  In general, she is able to exert without limitation in terms of shortness of breath or chest discomfort.    She states her current antihypertensive regimen is lisinopril 10 mg daily and Coreg 25 mg once daily.  She reports that sometimes her systolic blood pressures will dip into the 80s and 90s and she feels weak and other times her systolic blood pressure will elevate to 150s.    The following portions of the patient's history were reviewed and updated as appropriate: allergies, current medications, past family history, past medical history, past social history, past surgical history and problem list.    Review of Systems   Constitutional: Negative for malaise/fatigue.   Cardiovascular: Positive for chest pain (heaviness on humid days, chronic, stable ), dyspnea on exertion (mild, chronic, stable ), leg swelling (some ankle swelling in evenings ) and palpitations (chronic, improved ). Negative for claudication, near-syncope, orthopnea, paroxysmal nocturnal dyspnea and syncope.   Respiratory: Negative for cough.    Hematologic/Lymphatic: Does not bruise/bleed easily.   Musculoskeletal: Positive for back pain (acute episode 8 months ago ). Negative for falls.   Gastrointestinal: Negative for bloating.   Neurological: Negative for dizziness, light-headedness and weakness.       Allergies   Allergen Reactions   • Codeine Shortness Of Breath   • Adhesive Tape Itching     Redness and itching    • Bactrim [Sulfamethoxazole-Trimethoprim] Other (See Comments)     BURN TONGUE   • Statins Other (See Comments)     ELEVATED LIVER ENZYMES       Current Outpatient Medications:   •  carvedilol (COREG) 25 MG tablet, Take 12.5 mg by mouth 2 (Two) Times a  "Day., Disp: , Rfl:   •  ibuprofen (ADVIL,MOTRIN) 200 MG tablet, Take 600 mg by mouth As Needed for Mild Pain ., Disp: , Rfl:   •  LANSOPRAZOLE PO, Take 1 tablet by mouth Daily., Disp: , Rfl:   •  lisinopril (PRINIVIL,ZESTRIL) 10 MG tablet, Take 10 mg by mouth., Disp: , Rfl:   •  ezetimibe (ZETIA) 10 MG tablet, Take 1 tablet by mouth Daily., Disp: 90 tablet, Rfl: 1         Objective:    /82   Pulse 70   Ht 160 cm (62.99\")   Wt 65.9 kg (145 lb 3.2 oz)   LMP  (LMP Unknown)   SpO2 99%   BMI 25.73 kg/m²        Vitals and nursing note reviewed.   Constitutional:       General: Not in acute distress.     Appearance: Well-developed and not in distress. Not diaphoretic.   Neck:      Vascular: No JVD.   Pulmonary:      Effort: Pulmonary effort is normal. No respiratory distress.      Breath sounds: Normal breath sounds.   Cardiovascular:      Normal rate. Regular rhythm.      Murmurs: There is no murmur.   Edema:     Peripheral edema absent.   Abdominal:      Tenderness: There is no abdominal tenderness.   Skin:     General: Skin is warm and dry.   Neurological:      Mental Status: Alert and oriented to person, place, and time.         Lab Review:   Lab Results   Component Value Date    GLUCOSE 109 (H) 03/02/2022    BUN 13 03/02/2022    CREATININE 0.97 03/02/2022    BCR 13.4 03/02/2022    K 4.6 03/02/2022    CO2 27.6 03/02/2022    CALCIUM 9.4 03/02/2022    ALBUMIN 4.50 03/02/2022    AST 22 03/02/2022    ALT 15 03/02/2022     No results found for: TSH     Lab Results   Component Value Date    CHOL 265 (H) 03/02/2022    TRIG 138 03/02/2022    HDL 85 (H) 03/02/2022     (H) 03/02/2022     Lab Results   Component Value Date    WBC 3.92 03/02/2022    HGB 12.7 03/02/2022    HCT 38.9 03/02/2022    MCV 95.8 03/02/2022     03/02/2022             ECG 12 Lead    Date/Time: 8/1/2022 4:28 PM  Performed by: Marilyn Villafana APRN  Authorized by: Marilyn Villafnaa APRN   Comparison: compared with previous ECG   Comparison " to previous ECG: Septal Q waves.  There is no previous EKG for comparison, but the patient presents to me with a printed EKG interpretation from July 2017 that reports septal Q waves  Rhythm: sinus rhythm  BPM: 67          10/2020 TTE report reviewed :    Conclusions     Summary:     1. Left ventricle: The cavity size is normal. Wall thickness is normal.      Systolic function is at the lower limits of normal. The estimated      ejection fraction is 50-55%. Possible moderate hypokinesis of the      basalinferior myocardium. Doppler parameters are consistent with abnormal      left ventricular relaxation (grade 1 diastolic dysfunction).   2. Aortic valve: Peak velocity (S): 1.2m/sec.   3. Right ventricle: Estimation of the right ventricular systolic pressure is      within the normal range. RV systolic pressure (S, est): 30mm Hg.   4. Pericardium, extracardiac: There is no significant pericardial effusion.     Prepared and signed by   Ken Luis MD   10/30/2020 12:17          Assessment:      Problem List Items Addressed This Visit        Cardiac and Vasculature    Essential hypertension - Primary    Mixed hyperlipidemia       Tobacco    Former smoker          Plan:     1.  Essential hypertension: Established problem, historically this has been difficult to control and she has had numerous side effects or adverse reactions to multiple antihypertensives.  At present, she states her blood pressures are labile (sometimes low and sometimes high) on her current regimen of lisinopril 10 mg once daily and Coreg 25 mg once daily.  -I have recommended she take Coreg twice daily as prescribed to try to help with more consistent blood pressure control-take Coreg 12.5 mg twice daily  -Continue lisinopril 10 mg daily  -She reports previous hypotension on Coreg 25 mg twice daily  -She reports amlodipine 5 mg bottomed out her blood pressure previously, and she no longer takes this  -She reports hydrochlorothiazide contributed to  "leg cramps in the past and she no longer takes this  -Continue to follow-up with PCP for blood pressure management    2.  Mixed hyperlipidemia: See results above.  Her cholesterol is uncontrolled on current regimen, to include Zetia.  Continue to follow closely with PCP for treatment.  She may need to consider retrial of low-dose statin therapy with close attention to her LFTs and monitoring for side effects    3.  Former smoker: She was counseled on the importance of remaining abstinent from tobacco use    4.  Family history of early CAD: She reports her mom developed coronary disease in her 40s.  The patient  does have septal Q waves on her EKG, which according to previous EKG interpretation from July 2017 these were present at that time as well.  I have no actual previous EKGs for comparison at this time.  Her echo report reads \"Possible moderate hypokinesis of the basalinferior myocardium.\" I will request records to include actual prior EKGs and prior Holter monitors before making further recommendations.  At this point she is not having symptoms to suggest myocardial ischemia such that I think an ischemic evaluation is warranted in the immediate future but we will contact her again after more records are obtained if more testing is recommended.    Continue to follow closely with PCP for risk factor modification    Follow-up with cardiology as needed.  Thank you for this referral.    "

## 2022-08-08 ENCOUNTER — DOCUMENTATION (OUTPATIENT)
Dept: LAB | Facility: HOSPITAL | Age: 62
End: 2022-08-08

## 2022-08-09 ENCOUNTER — DOCUMENTATION (OUTPATIENT)
Dept: LAB | Facility: HOSPITAL | Age: 62
End: 2022-08-09

## 2022-08-09 DIAGNOSIS — R07.89 OTHER CHEST PAIN: Primary | ICD-10-CM

## 2022-08-12 ENCOUNTER — HOSPITAL ENCOUNTER (OUTPATIENT)
Dept: CT IMAGING | Facility: HOSPITAL | Age: 62
Discharge: HOME | End: 2022-08-12

## 2022-08-12 VITALS
SYSTOLIC BLOOD PRESSURE: 120 MMHG | BODY MASS INDEX: 24.24 KG/M2 | OXYGEN SATURATION: 96 % | RESPIRATION RATE: 15 BRPM | HEIGHT: 64 IN | DIASTOLIC BLOOD PRESSURE: 75 MMHG | WEIGHT: 142 LBS | HEART RATE: 57 BPM | TEMPERATURE: 97 F

## 2022-08-12 DIAGNOSIS — R07.89 OTHER CHEST PAIN: ICD-10-CM

## 2022-08-12 DIAGNOSIS — R93.89 ABNORMAL FINDINGS ON DIAGNOSTIC IMAGING OF CARDIOVASCULAR SYSTEM: Primary | ICD-10-CM

## 2022-08-12 LAB
CREAT SERPL-MCNC: 0.88 MG/DL (ref 0.57–1)
EGFRCR SERPLBLD CKD-EPI 2021: 73.5 ML/MIN/1.73

## 2022-08-12 PROCEDURE — 0 IOPAMIDOL PER 1 ML: Performed by: NURSE PRACTITIONER

## 2022-08-12 PROCEDURE — 75574 CT ANGIO HRT W/3D IMAGE: CPT | Performed by: INTERNAL MEDICINE

## 2022-08-12 PROCEDURE — 82565 ASSAY OF CREATININE: CPT | Performed by: INTERNAL MEDICINE

## 2022-08-12 PROCEDURE — 75574 CT ANGIO HRT W/3D IMAGE: CPT

## 2022-08-12 RX ORDER — SODIUM CHLORIDE 0.9 % (FLUSH) 0.9 %
3 SYRINGE (ML) INJECTION EVERY 12 HOURS SCHEDULED
Status: DISCONTINUED | OUTPATIENT
Start: 2022-08-12 | End: 2022-08-13 | Stop reason: HOSPADM

## 2022-08-12 RX ORDER — METOPROLOL TARTRATE 100 MG/1
100 TABLET ORAL ONCE AS NEEDED
Status: DISCONTINUED | OUTPATIENT
Start: 2022-08-12 | End: 2022-08-13 | Stop reason: HOSPADM

## 2022-08-12 RX ORDER — NITROGLYCERIN 0.4 MG/1
TABLET SUBLINGUAL
Status: COMPLETED | OUTPATIENT
Start: 2022-08-12 | End: 2022-08-12

## 2022-08-12 RX ORDER — LIDOCAINE HYDROCHLORIDE 10 MG/ML
5 INJECTION, SOLUTION EPIDURAL; INFILTRATION; INTRACAUDAL; PERINEURAL AS NEEDED
Status: DISCONTINUED | OUTPATIENT
Start: 2022-08-12 | End: 2022-08-13 | Stop reason: HOSPADM

## 2022-08-12 RX ORDER — SODIUM CHLORIDE 0.9 % (FLUSH) 0.9 %
10 SYRINGE (ML) INJECTION AS NEEDED
Status: DISCONTINUED | OUTPATIENT
Start: 2022-08-12 | End: 2022-08-13 | Stop reason: HOSPADM

## 2022-08-12 RX ORDER — METOPROLOL TARTRATE 50 MG/1
50 TABLET, FILM COATED ORAL ONCE AS NEEDED
Status: DISCONTINUED | OUTPATIENT
Start: 2022-08-12 | End: 2022-08-13 | Stop reason: HOSPADM

## 2022-08-12 RX ADMIN — NITROGLYCERIN 0.4 MG: 0.4 TABLET SUBLINGUAL at 15:21

## 2022-08-12 RX ADMIN — IOPAMIDOL 100 ML: 755 INJECTION, SOLUTION INTRAVENOUS at 15:24

## 2022-08-12 RX ADMIN — NITROGLYCERIN 0.4 MG: 0.4 TABLET SUBLINGUAL at 15:17

## 2022-08-16 ENCOUNTER — HOSPITAL ENCOUNTER (OUTPATIENT)
Dept: CT IMAGING | Facility: HOSPITAL | Age: 62
Discharge: HOME | End: 2022-08-16
Admitting: INTERNAL MEDICINE

## 2022-08-16 DIAGNOSIS — R93.89 ABNORMAL FINDINGS ON DIAGNOSTIC IMAGING OF CARDIOVASCULAR SYSTEM: ICD-10-CM

## 2022-08-16 PROCEDURE — 0504T CT CORONARY FFR CTA DATA ALYS & GNRJ ESTIMATED FFR MODEL: CPT | Performed by: INTERNAL MEDICINE

## 2022-08-16 PROCEDURE — 0502T HC CORONARY FFR DERIVED CTA DATA PREP & TRANSMIS: CPT

## 2022-08-16 PROCEDURE — 0503T HC CORONARY FFR CTA DATA ALYS & GNRJ ESTIMATED FFR MODEL: CPT

## 2022-08-24 DIAGNOSIS — K21.9 GASTROESOPHAGEAL REFLUX DISEASE, UNSPECIFIED WHETHER ESOPHAGITIS PRESENT: Primary | ICD-10-CM

## 2022-08-24 RX ORDER — LANSOPRAZOLE 30 MG/1
30 CAPSULE, DELAYED RELEASE ORAL DAILY
Qty: 30 CAPSULE | Refills: 5 | Status: SHIPPED | OUTPATIENT
Start: 2022-08-24 | End: 2022-11-28

## 2022-10-11 ENCOUNTER — TELEMEDICINE (OUTPATIENT)
Dept: INTERNAL MEDICINE | Facility: CLINIC | Age: 62
End: 2022-10-11

## 2022-10-11 DIAGNOSIS — U07.1 COVID-19: Primary | ICD-10-CM

## 2022-10-11 PROCEDURE — 99213 OFFICE O/P EST LOW 20 MIN: CPT | Performed by: NURSE PRACTITIONER

## 2022-10-11 RX ORDER — FLUTICASONE PROPIONATE 50 MCG
2 SPRAY, SUSPENSION (ML) NASAL DAILY
Qty: 16 G | Refills: 0 | Status: SHIPPED | OUTPATIENT
Start: 2022-10-11

## 2022-10-11 RX ORDER — DEXTROMETHORPHAN HYDROBROMIDE AND PROMETHAZINE HYDROCHLORIDE 15; 6.25 MG/5ML; MG/5ML
5 SYRUP ORAL 4 TIMES DAILY PRN
Qty: 240 ML | Refills: 0 | Status: SHIPPED | OUTPATIENT
Start: 2022-10-11 | End: 2023-03-06

## 2022-10-11 NOTE — PROGRESS NOTES
Video Visit    You have chosen to receive care through a telehealth visit.  Do you consent to use a video/audio connection for your medical care today? Yes      Chief Complaint   Patient presents with   • Cough     Pt tested positive for COVID on 10/7, symptoms started on 10/6         History:  Melony Rincon is a 62 y.o. female who presents today for video visit. The patient is located at Home and I am located at Work         Symptoms 10/6/22, tested positive for Covid 10/7/22.   also positive.  Upper respiratory symptoms, sinus, low-grade fever, bronchial congestion.  Yesterday rash around waist, small red bumps, hard to see, itches a little.. Burning sore throat with white splotches in the skin, not exudate.  Symptoms better today.  No more fever, throat improved. No respiratory distress.    Cough        ROS:  Review of Systems   Respiratory: Positive for cough.    as above      Allergies   Allergen Reactions   • Codeine Shortness Of Breath   • Adhesive Tape Itching     Redness and itching    • Bactrim [Sulfamethoxazole-Trimethoprim] Other (See Comments)     BURN TONGUE   • Statins Other (See Comments)     ELEVATED LIVER ENZYMES         Current Outpatient Medications:   •  carvedilol (COREG) 25 MG tablet, Take 12.5 mg by mouth 2 (Two) Times a Day., Disp: , Rfl:   •  ezetimibe (ZETIA) 10 MG tablet, Take 1 tablet by mouth Daily., Disp: 90 tablet, Rfl: 1  •  ibuprofen (ADVIL,MOTRIN) 200 MG tablet, Take 600 mg by mouth As Needed for Mild Pain ., Disp: , Rfl:   •  lansoprazole (PREVACID) 30 MG capsule, Take 1 capsule by mouth Daily., Disp: 30 capsule, Rfl: 5  •  lisinopril (PRINIVIL,ZESTRIL) 10 MG tablet, Take 1 tablet by mouth 2 (Two) Times a Day., Disp: , Rfl:   •  fluticasone (Flonase) 50 MCG/ACT nasal spray, 2 sprays into the nostril(s) as directed by provider Daily., Disp: 16 g, Rfl: 0  •  promethazine-dextromethorphan (PROMETHAZINE-DM) 6.25-15 MG/5ML syrup, Take 5 mL by mouth 4 (Four) Times a Day As  Needed for Cough., Disp: 240 mL, Rfl: 0    OBJECTIVE:  Visit Vitals  LMP  (LMP Unknown)      Physical Exam  Vitals and nursing note reviewed.   Constitutional:       General: She is not in acute distress.     Appearance: Normal appearance. She is not ill-appearing, toxic-appearing or diaphoretic.      Comments: Appropriate, conversational, smiling, no distress.   HENT:      Head: Normocephalic and atraumatic.   Pulmonary:      Effort: Pulmonary effort is normal. No respiratory distress.   Neurological:      Mental Status: She is alert and oriented to person, place, and time.   Psychiatric:         Mood and Affect: Mood normal.         Behavior: Behavior normal.         Thought Content: Thought content normal.         Judgment: Judgment normal.         [facility] [place]    Assessment/Plan    Diagnoses and all orders for this visit:    1. COVID-19 (Primary)    Other orders  -     promethazine-dextromethorphan (PROMETHAZINE-DM) 6.25-15 MG/5ML syrup; Take 5 mL by mouth 4 (Four) Times a Day As Needed for Cough.  Dispense: 240 mL; Refill: 0  -     fluticasone (Flonase) 50 MCG/ACT nasal spray; 2 sprays into the nostril(s) as directed by provider Daily.  Dispense: 16 g; Refill: 0    She is at the end of day 5 since symptoms started. Symptoms have been relatively mild, and she is noticing definite improvement today. Her risk factors include advanced age and hypertension.  We discussed Paxlovid and decided she will not take at this time.  Will continue to treat symptomatically. I have asked her to let us know if her cough worsens. She has Mucinex at home, and I think it's a good idea to take this as well. Lots of fluid and rest, as much activity around the house as tolerated, move around.  Reviewed quarantine and masking recommendations.  Please let us know if worsening in any way.    Education materials and an After Visit Summary were provided to patient via [employer]    Return if symptoms worsen or fail to improve, for Next scheduled  follow up.      This visit has been scheduled as a video visit to comply with patient safety concerns in accordance with CDC recommendations. Total time was 32 minutes.    HARI Mejía  16:06 CDT  10/11/2022

## 2022-11-25 DIAGNOSIS — K21.9 GASTROESOPHAGEAL REFLUX DISEASE, UNSPECIFIED WHETHER ESOPHAGITIS PRESENT: ICD-10-CM

## 2022-11-28 RX ORDER — LANSOPRAZOLE 30 MG/1
30 CAPSULE, DELAYED RELEASE ORAL DAILY
Qty: 90 CAPSULE | Refills: 3 | Status: SHIPPED | OUTPATIENT
Start: 2022-11-28

## 2023-01-20 RX ORDER — EZETIMIBE 10 MG/1
10 TABLET ORAL DAILY
Qty: 90 TABLET | Refills: 1 | Status: SHIPPED | OUTPATIENT
Start: 2023-01-20

## 2023-03-06 ENCOUNTER — OFFICE VISIT (OUTPATIENT)
Dept: INTERNAL MEDICINE | Facility: CLINIC | Age: 63
End: 2023-03-06
Payer: MEDICARE

## 2023-03-06 VITALS
SYSTOLIC BLOOD PRESSURE: 114 MMHG | HEIGHT: 64 IN | HEART RATE: 85 BPM | WEIGHT: 152.6 LBS | DIASTOLIC BLOOD PRESSURE: 84 MMHG | OXYGEN SATURATION: 99 % | TEMPERATURE: 97.4 F | BODY MASS INDEX: 26.05 KG/M2

## 2023-03-06 DIAGNOSIS — Z12.31 ENCOUNTER FOR SCREENING MAMMOGRAM FOR MALIGNANT NEOPLASM OF BREAST: ICD-10-CM

## 2023-03-06 DIAGNOSIS — I10 ESSENTIAL HYPERTENSION: ICD-10-CM

## 2023-03-06 DIAGNOSIS — E78.5 HYPERLIPIDEMIA, UNSPECIFIED HYPERLIPIDEMIA TYPE: ICD-10-CM

## 2023-03-06 DIAGNOSIS — F17.201 TOBACCO USE DISORDER, MODERATE, IN SUSTAINED REMISSION: ICD-10-CM

## 2023-03-06 DIAGNOSIS — Z13.31 DEPRESSION SCREEN: ICD-10-CM

## 2023-03-06 DIAGNOSIS — Z91.81 AT LOW RISK FOR FALL: ICD-10-CM

## 2023-03-06 DIAGNOSIS — F17.211 NICOTINE DEPENDENCE, CIGARETTES, IN REMISSION: ICD-10-CM

## 2023-03-06 DIAGNOSIS — Z00.00 WELCOME TO MEDICARE PREVENTIVE VISIT: Primary | ICD-10-CM

## 2023-03-06 PROCEDURE — 1160F RVW MEDS BY RX/DR IN RCRD: CPT | Performed by: INTERNAL MEDICINE

## 2023-03-06 PROCEDURE — 1170F FXNL STATUS ASSESSED: CPT | Performed by: INTERNAL MEDICINE

## 2023-03-06 PROCEDURE — 1126F AMNT PAIN NOTED NONE PRSNT: CPT | Performed by: INTERNAL MEDICINE

## 2023-03-06 PROCEDURE — G0402 INITIAL PREVENTIVE EXAM: HCPCS | Performed by: INTERNAL MEDICINE

## 2023-03-06 PROCEDURE — 1125F AMNT PAIN NOTED PAIN PRSNT: CPT | Performed by: INTERNAL MEDICINE

## 2023-03-06 PROCEDURE — 1159F MED LIST DOCD IN RCRD: CPT | Performed by: INTERNAL MEDICINE

## 2023-03-06 RX ORDER — ASPIRIN 81 MG/1
81 TABLET ORAL DAILY
Start: 2023-03-06

## 2023-03-06 NOTE — PROGRESS NOTES
The ABCs of the Annual Wellness Visit  [facility]    Subjective     Melony Rincon is a 62 y.o. female who presents for a  Welcome to Medicare Visit.    Lipitor caused elevated liver enzymes and she's hesitant to start a different statin    Allergic rhinitis symptoms. Alternates zyrtec and flonase but flonase causes epistaxis    The following portions of the patient's history were reviewed and   updated as appropriate: allergies, current medications, past family history, past medical history, past social history, past surgical history and problem list.     Compared to one year ago, the patient feels her physical   health is the same.    Compared to one year ago, the patient feels her mental   health is the same.    Recent Hospitalizations:  She was not admitted to the hospital during the last year.       Current Medical Providers:  Patient Care Team:  ANDRÉS Louis MD as PCP - General (Internal Medicine)  Knees, HARI Hernandez as Nurse Practitioner (Family Medicine)    Outpatient Medications Prior to Visit   Medication Sig Dispense Refill   • carvedilol (COREG) 25 MG tablet Take 12.5 mg by mouth 2 (Two) Times a Day.     • ezetimibe (ZETIA) 10 MG tablet Take 1 tablet by mouth Daily. 90 tablet 1   • fluticasone (Flonase) 50 MCG/ACT nasal spray 2 sprays into the nostril(s) as directed by provider Daily. 16 g 0   • ibuprofen (ADVIL,MOTRIN) 200 MG tablet Take 3 tablets by mouth As Needed for Mild Pain.     • lansoprazole (PREVACID) 30 MG capsule Take 1 capsule by mouth Daily. 90 capsule 3   • lisinopril (PRINIVIL,ZESTRIL) 10 MG tablet Take 1 tablet by mouth 2 (Two) Times a Day.     • promethazine-dextromethorphan (PROMETHAZINE-DM) 6.25-15 MG/5ML syrup Take 5 mL by mouth 4 (Four) Times a Day As Needed for Cough. 240 mL 0     No facility-administered medications prior to visit.       No opioid medication identified on active medication list. I have reviewed chart for other potential  high risk  "medication/s and harmful drug interactions in the elderly.          Aspirin is not on active medication list.  Aspirin use is indicated based on review of current medical condition/s. Pros and cons of this therapy have been discussed with this patient. Benefits of this medication outweigh potential harm.  Patient has been instructed to start taking this medication..    Patient Active Problem List   Diagnosis   • Essential hypertension   • Mixed hyperlipidemia   • Former smoker   • GERD (gastroesophageal reflux disease)   • Tobacco use disorder, moderate, in sustained remission     Advance Care Planning  Advance Directive is not on file.  ACP discussion was held with the patient during this visit. Patient does not have an advance directive, declines further assistance.       Objective   Vitals:    03/06/23 0901   BP: 114/84   BP Location: Left arm   Patient Position: Sitting   Cuff Size: Adult   Pulse: 85   Temp: 97.4 °F (36.3 °C)   TempSrc: Temporal   SpO2: 99%   Weight: 69.2 kg (152 lb 9.6 oz)   Height: 162.6 cm (64\")   PainSc: 0-No pain     ROS and PE unremarkable     Estimated body mass index is 26.19 kg/m² as calculated from the following:    Height as of this encounter: 162.6 cm (64\").    Weight as of this encounter: 69.2 kg (152 lb 9.6 oz).    BMI is >= 25 and <30. (Overweight) The following options were offered after discussion;: exercise counseling/recommendations      Does the patient have evidence of cognitive impairment?   No         Procedures       HEALTH RISK ASSESSMENT    Smoking Status:  Social History     Tobacco Use   Smoking Status Former   • Packs/day: 2.00   • Years: 20.00   • Pack years: 40.00   • Types: Cigarettes   Smokeless Tobacco Never     Alcohol Consumption:  Social History     Substance and Sexual Activity   Alcohol Use Yes   • Alcohol/week: 10.0 standard drinks   • Types: 10 Glasses of wine per week    Comment: >3 times a week       Fall Risk Screen:    [facility] Fall Risk Assessment was " completed, and patient is at LOW risk for falls.Assessment completed on:3/6/2023    Depression Screen:   PHQ-2/PHQ-9 Depression Screening 3/6/2023   Little Interest or Pleasure in Doing Things 0-->not at all   Feeling Down, Depressed or Hopeless 0-->not at all   PHQ-9: Brief Depression Severity Measure Score 0       Health Habits and Functional and Cognitive Screening:  Functional & Cognitive Status 3/6/2023   Do you have difficulty preparing food and eating? No   Do you have difficulty bathing yourself, getting dressed or grooming yourself? No   Do you have difficulty using the toilet? No   Do you have difficulty moving around from place to place? No   Do you have trouble with steps or getting out of a bed or a chair? No   Current Diet Well Balanced Diet   Dental Exam Up to date   Eye Exam Up to date   Exercise (times per week) 0 times per week   Current Exercises Include No Regular Exercise   Do you need help using the phone?  No   Are you deaf or do you have serious difficulty hearing?  No   Do you need help with transportation? No   Do you need help shopping? No   Do you need help preparing meals?  No   Do you need help with housework?  No   Do you need help with laundry? No   Do you need help taking your medications? No   Do you need help managing money? No   Do you ever drive or ride in a car without wearing a seat belt? No   Have you felt unusual stress, anger or loneliness in the last month? No   Who do you live with? Spouse   If you need help, do you have trouble finding someone available to you? No   Have you been bothered in the last four weeks by sexual problems? No   Do you have difficulty concentrating, remembering or making decisions? No       Visual Acuity:    Vision Screening    Right eye Left eye Both eyes   Without correction 20/25 20/25 20/25   With correction          Age-appropriate Screening Schedule:  Refer to the list below for future screening recommendations based on patient's age, sex  and/or medical conditions. Orders for these recommended tests are listed in the plan section. The patient has been provided with a written plan.    Health Maintenance   Topic Date Due   • TDAP/TD VACCINES (1 - Tdap) Never done   • ZOSTER VACCINE (1 of 2) Never done   • ANNUAL PHYSICAL  Never done   • LIPID PANEL  03/02/2023   • COVID-19 Vaccine (5 - Booster for Moderna series) 03/14/2023   • MAMMOGRAM  05/17/2023   • PAP SMEAR  05/10/2025   • COLORECTAL CANCER SCREENING  03/30/2026   • HEPATITIS C SCREENING  Completed   • INFLUENZA VACCINE  Completed   • Pneumococcal Vaccine 0-64  Aged Out        CMS Preventative Services Quick Reference  Risk Factors Identified During Encounter    Immunizations Discussed/Encouraged: Shingrix  The above risks/problems have been discussed with the patient.  Pertinent information has been shared with the patient in the After Visit Summary.    Follow up plans and orders are seen below in the Assessment/Plan Section.    Diagnoses and all orders for this visit:    1. Welcome to Medicare preventive visit (Primary)  -     CBC (No Diff); Future  -     Comprehensive Metabolic Panel; Future  -     Hemoglobin A1c; Future  -     Lipid Panel; Future  -     Mammo Screening Digital Tomosynthesis Bilateral With CAD; Future    2. Depression screen    3. At low risk for fall    4. Encounter for screening mammogram for malignant neoplasm of breast  -     Mammo Screening Digital Tomosynthesis Bilateral With CAD; Future    5. Essential hypertension  -     Comprehensive Metabolic Panel; Future    6. Hyperlipidemia, unspecified hyperlipidemia type  -     Lipid Panel; Future    7. Tobacco use disorder, moderate, in sustained remission  -     CT Chest Low Dose Wo; Future    8. Nicotine dependence, cigarettes, in remission  -     CT Chest Low Dose Wo; Future    Other orders  -     aspirin 81 MG EC tablet; Take 1 tablet by mouth Daily.      Will check some labs as above.  Further recommendation based on those  results.  Goal LDL is less than 70.  She is on Zetia but had elevation of liver enzymes to Lipitor.  It may be worth considering a different statin with close monitoring.    I have ordered a mammogram to be done in May and a low-dose CT scan to be done in August.  She quit smoking tobacco 12 years ago.    We discussed Shingrix vaccine and she is interested in getting this done at her local pharmacy.    Follow Up:   Initial Medicare Visit in one year    An After Visit Summary and PPPS were made available to the patient.

## 2023-03-14 ENCOUNTER — LAB (OUTPATIENT)
Dept: LAB | Facility: HOSPITAL | Age: 63
End: 2023-03-14
Payer: MEDICARE

## 2023-03-14 DIAGNOSIS — Z00.00 WELCOME TO MEDICARE PREVENTIVE VISIT: ICD-10-CM

## 2023-03-14 DIAGNOSIS — I10 ESSENTIAL HYPERTENSION: ICD-10-CM

## 2023-03-14 DIAGNOSIS — E78.5 HYPERLIPIDEMIA, UNSPECIFIED HYPERLIPIDEMIA TYPE: ICD-10-CM

## 2023-03-14 LAB
ALBUMIN SERPL-MCNC: 4.4 G/DL (ref 3.5–5.2)
ALBUMIN/GLOB SERPL: 1.6 G/DL
ALP SERPL-CCNC: 70 U/L (ref 39–117)
ALT SERPL W P-5'-P-CCNC: 16 U/L (ref 1–33)
ANION GAP SERPL CALCULATED.3IONS-SCNC: 12.4 MMOL/L (ref 5–15)
AST SERPL-CCNC: 25 U/L (ref 1–32)
BILIRUB SERPL-MCNC: 0.4 MG/DL (ref 0–1.2)
BUN SERPL-MCNC: 14 MG/DL (ref 8–23)
BUN/CREAT SERPL: 16.9 (ref 7–25)
CALCIUM SPEC-SCNC: 9.7 MG/DL (ref 8.6–10.5)
CHLORIDE SERPL-SCNC: 99 MMOL/L (ref 98–107)
CHOLEST SERPL-MCNC: 317 MG/DL (ref 0–200)
CO2 SERPL-SCNC: 27.6 MMOL/L (ref 22–29)
CREAT SERPL-MCNC: 0.83 MG/DL (ref 0.57–1)
DEPRECATED RDW RBC AUTO: 43.2 FL (ref 37–54)
EGFRCR SERPLBLD CKD-EPI 2021: 78.8 ML/MIN/1.73
ERYTHROCYTE [DISTWIDTH] IN BLOOD BY AUTOMATED COUNT: 12.8 % (ref 12.3–15.4)
GLOBULIN UR ELPH-MCNC: 2.7 GM/DL
GLUCOSE SERPL-MCNC: 96 MG/DL (ref 65–99)
HBA1C MFR BLD: 5.2 % (ref 4.8–5.6)
HCT VFR BLD AUTO: 38.3 % (ref 34–46.6)
HDLC SERPL-MCNC: 71 MG/DL (ref 40–60)
HGB BLD-MCNC: 13 G/DL (ref 12–15.9)
LDLC SERPL CALC-MCNC: 200 MG/DL (ref 0–100)
LDLC/HDLC SERPL: 2.79 {RATIO}
MCH RBC QN AUTO: 32 PG (ref 26.6–33)
MCHC RBC AUTO-ENTMCNC: 33.9 G/DL (ref 31.5–35.7)
MCV RBC AUTO: 94.3 FL (ref 79–97)
PLATELET # BLD AUTO: 190 10*3/MM3 (ref 140–450)
PMV BLD AUTO: 11.4 FL (ref 6–12)
POTASSIUM SERPL-SCNC: 4.3 MMOL/L (ref 3.5–5.2)
PROT SERPL-MCNC: 7.1 G/DL (ref 6–8.5)
RBC # BLD AUTO: 4.06 10*6/MM3 (ref 3.77–5.28)
SODIUM SERPL-SCNC: 139 MMOL/L (ref 136–145)
TRIGL SERPL-MCNC: 238 MG/DL (ref 0–150)
VLDLC SERPL-MCNC: 46 MG/DL (ref 5–40)
WBC NRBC COR # BLD: 4.2 10*3/MM3 (ref 3.4–10.8)

## 2023-03-14 PROCEDURE — 80053 COMPREHEN METABOLIC PANEL: CPT

## 2023-03-14 PROCEDURE — 85027 COMPLETE CBC AUTOMATED: CPT

## 2023-03-14 PROCEDURE — 80061 LIPID PANEL: CPT

## 2023-03-14 PROCEDURE — 83036 HEMOGLOBIN GLYCOSYLATED A1C: CPT

## 2023-03-14 PROCEDURE — 36415 COLL VENOUS BLD VENIPUNCTURE: CPT

## 2023-03-16 DIAGNOSIS — E78.2 MIXED HYPERLIPIDEMIA: Primary | ICD-10-CM

## 2023-03-16 RX ORDER — ROSUVASTATIN CALCIUM 5 MG/1
5 TABLET, COATED ORAL DAILY
Qty: 30 TABLET | Refills: 1 | Status: SHIPPED | OUTPATIENT
Start: 2023-03-16

## 2023-03-16 NOTE — PROGRESS NOTES
Will you please let her know I sent in a very low dose of Crestor, a different statin. I put in an order for re-check cholesterol and lipids in 2 months. Please let us know if she seems to be having any trouble with the medication before then.

## 2023-04-09 DIAGNOSIS — E78.2 MIXED HYPERLIPIDEMIA: ICD-10-CM

## 2023-04-10 RX ORDER — ROSUVASTATIN CALCIUM 5 MG/1
TABLET, COATED ORAL
Qty: 30 TABLET | Refills: 1 | OUTPATIENT
Start: 2023-04-10

## 2023-04-10 NOTE — TELEPHONE ENCOUNTER
She still has 1 more refill at the pharmacy.  Further adjustment in medication based on her repeat lab results

## 2023-04-10 NOTE — TELEPHONE ENCOUNTER
Rx Refill Note  Requested Prescriptions     Pending Prescriptions Disp Refills   • rosuvastatin (CRESTOR) 5 MG tablet [Pharmacy Med Name: ROSUVASTATIN CALCIUM 5 MG TAB] 30 tablet 1     Sig: TAKE 1 TABLET BY MOUTH EVERY DAY      Last office visit with prescribing clinician: Visit date not found   Last telemedicine visit with prescribing clinician: Visit date not found   Next office visit with prescribing clinician: Visit date not found                         Would you like a call back once the refill request has been completed: [] Yes [] No    If the office needs to give you a call back, can they leave a voicemail: [] Yes [] No    CHRISTIANO Lui  04/10/23, 10:48 CDT     Last office visit 03/06/2023, follow up 03/08/2024. Medication last filled 03/16/2023, qty 30, 1 refill

## 2023-05-03 DIAGNOSIS — E78.2 MIXED HYPERLIPIDEMIA: ICD-10-CM

## 2023-05-03 RX ORDER — ROSUVASTATIN CALCIUM 5 MG/1
5 TABLET, COATED ORAL DAILY
Qty: 30 TABLET | Refills: 1 | Status: SHIPPED | OUTPATIENT
Start: 2023-05-03

## 2023-05-22 ENCOUNTER — PATIENT MESSAGE (OUTPATIENT)
Dept: INTERNAL MEDICINE | Facility: CLINIC | Age: 63
End: 2023-05-22
Payer: MEDICARE

## 2023-05-22 RX ORDER — CARVEDILOL 25 MG/1
12.5 TABLET ORAL 2 TIMES DAILY
Qty: 30 TABLET | Refills: 0 | Status: SHIPPED | OUTPATIENT
Start: 2023-05-22 | End: 2023-06-21

## 2023-05-22 RX ORDER — LISINOPRIL 10 MG/1
10 TABLET ORAL 2 TIMES DAILY
Qty: 60 TABLET | Refills: 0 | Status: SHIPPED | OUTPATIENT
Start: 2023-05-22

## 2023-06-07 ENCOUNTER — LAB (OUTPATIENT)
Dept: LAB | Facility: HOSPITAL | Age: 63
End: 2023-06-07
Payer: MEDICARE

## 2023-06-07 DIAGNOSIS — E78.2 MIXED HYPERLIPIDEMIA: ICD-10-CM

## 2023-06-07 LAB
ALBUMIN SERPL-MCNC: 4.3 G/DL (ref 3.5–5.2)
ALBUMIN/GLOB SERPL: 1.6 G/DL
ALP SERPL-CCNC: 78 U/L (ref 39–117)
ALT SERPL W P-5'-P-CCNC: 19 U/L (ref 1–33)
ANION GAP SERPL CALCULATED.3IONS-SCNC: 10 MMOL/L (ref 5–15)
AST SERPL-CCNC: 28 U/L (ref 1–32)
BILIRUB SERPL-MCNC: 0.5 MG/DL (ref 0–1.2)
BUN SERPL-MCNC: 13 MG/DL (ref 8–23)
BUN/CREAT SERPL: 15.5 (ref 7–25)
CALCIUM SPEC-SCNC: 9.2 MG/DL (ref 8.6–10.5)
CHLORIDE SERPL-SCNC: 103 MMOL/L (ref 98–107)
CHOLEST SERPL-MCNC: 221 MG/DL (ref 0–200)
CO2 SERPL-SCNC: 29 MMOL/L (ref 22–29)
CREAT SERPL-MCNC: 0.84 MG/DL (ref 0.57–1)
EGFRCR SERPLBLD CKD-EPI 2021: 77.2 ML/MIN/1.73
GLOBULIN UR ELPH-MCNC: 2.7 GM/DL
GLUCOSE SERPL-MCNC: 104 MG/DL (ref 65–99)
HDLC SERPL-MCNC: 72 MG/DL (ref 40–60)
LDLC SERPL CALC-MCNC: 112 MG/DL (ref 0–100)
LDLC/HDLC SERPL: 1.46 {RATIO}
POTASSIUM SERPL-SCNC: 4.1 MMOL/L (ref 3.5–5.2)
PROT SERPL-MCNC: 7 G/DL (ref 6–8.5)
SODIUM SERPL-SCNC: 142 MMOL/L (ref 136–145)
TRIGL SERPL-MCNC: 219 MG/DL (ref 0–150)
VLDLC SERPL-MCNC: 37 MG/DL (ref 5–40)

## 2023-06-07 PROCEDURE — 80061 LIPID PANEL: CPT

## 2023-06-07 PROCEDURE — 36415 COLL VENOUS BLD VENIPUNCTURE: CPT

## 2023-06-07 PROCEDURE — 80053 COMPREHEN METABOLIC PANEL: CPT

## 2023-06-09 DIAGNOSIS — E78.2 MIXED HYPERLIPIDEMIA: ICD-10-CM

## 2023-06-09 RX ORDER — LISINOPRIL 10 MG/1
10 TABLET ORAL 2 TIMES DAILY
Qty: 60 TABLET | Refills: 0 | Status: SHIPPED | OUTPATIENT
Start: 2023-06-09

## 2023-06-09 RX ORDER — ROSUVASTATIN CALCIUM 5 MG/1
5 TABLET, COATED ORAL DAILY
Qty: 30 TABLET | Refills: 5 | Status: SHIPPED | OUTPATIENT
Start: 2023-06-09

## 2023-06-09 NOTE — TELEPHONE ENCOUNTER
Rx Refill Note  Requested Prescriptions     Pending Prescriptions Disp Refills    lisinopril (PRINIVIL,ZESTRIL) 10 MG tablet [Pharmacy Med Name: LISINOPRIL 10 MG TABLET] 60 tablet 0     Sig: TAKE 1 TABLET BY MOUTH TWICE A DAY      Last office visit with prescribing clinician: 3/6/2023   Last telemedicine visit with prescribing clinician: Visit date not found   Next office visit with prescribing clinician: 6/9/2023                         Would you like a call back once the refill request has been completed: [] Yes [] No    If the office needs to give you a call back, can they leave a voicemail: [] Yes [] No    Jimmie Sow MA  06/09/23, 09:39 CDT

## 2023-06-09 NOTE — TELEPHONE ENCOUNTER
Rx Refill Note  Requested Prescriptions     Pending Prescriptions Disp Refills    rosuvastatin (CRESTOR) 5 MG tablet [Pharmacy Med Name: ROSUVASTATIN CALCIUM 5 MG TAB] 30 tablet 1     Sig: TAKE 1 TABLET BY MOUTH EVERY DAY      Last office visit with prescribing clinician: 3/6/2023   Last telemedicine visit with prescribing clinician: Visit date not found   Next office visit with prescribing clinician: 3/8/2024                         Would you like a call back once the refill request has been completed: [] Yes [] No    If the office needs to give you a call back, can they leave a voicemail: [] Yes [] No    Jimmie Sow MA  06/09/23, 09:41 CDT

## 2023-06-14 RX ORDER — CARVEDILOL 25 MG/1
25 TABLET ORAL 2 TIMES DAILY WITH MEALS
Qty: 60 TABLET | Refills: 5 | Status: SHIPPED | OUTPATIENT
Start: 2023-06-14

## 2023-06-14 NOTE — TELEPHONE ENCOUNTER
Rx Refill Note  Requested Prescriptions     Pending Prescriptions Disp Refills   • carvedilol (COREG) 25 MG tablet [Pharmacy Med Name: CARVEDILOL 25 MG TABLET] 30 tablet 0     Sig: TAKE 0.5 TABLETS BY MOUTH 2 (TWO) TIMES A DAY FOR 30 DAYS.      Last office visit with prescribing clinician: 3/6/2023   Last telemedicine visit with prescribing clinician: Visit date not found   Next office visit with prescribing clinician: 3/8/2024                         Would you like a call back once the refill request has been completed: [] Yes [] No    If the office needs to give you a call back, can they leave a voicemail: [] Yes [] No    Idania Cannon, CHRISTIANO  06/14/23, 13:51 CDT

## 2023-08-07 ENCOUNTER — HOSPITAL ENCOUNTER (OUTPATIENT)
Dept: CT IMAGING | Facility: HOSPITAL | Age: 63
Discharge: HOME | End: 2023-08-07
Admitting: INTERNAL MEDICINE
Payer: MEDICARE

## 2023-08-07 DIAGNOSIS — F17.211 NICOTINE DEPENDENCE, CIGARETTES, IN REMISSION: ICD-10-CM

## 2023-08-07 DIAGNOSIS — F17.201 TOBACCO USE DISORDER, MODERATE, IN SUSTAINED REMISSION: ICD-10-CM

## 2023-08-07 PROCEDURE — 71271 CT THORAX LUNG CANCER SCR C-: CPT

## 2023-08-14 ENCOUNTER — APPOINTMENT (OUTPATIENT)
Dept: GENERAL RADIOLOGY | Facility: HOSPITAL | Age: 63
DRG: 868 | End: 2023-08-14
Payer: MEDICARE

## 2023-08-14 ENCOUNTER — LAB (OUTPATIENT)
Dept: LAB | Facility: HOSPITAL | Age: 63
DRG: 868 | End: 2023-08-14
Payer: MEDICARE

## 2023-08-14 ENCOUNTER — APPOINTMENT (OUTPATIENT)
Dept: CT IMAGING | Facility: HOSPITAL | Age: 63
DRG: 868 | End: 2023-08-14
Payer: MEDICARE

## 2023-08-14 ENCOUNTER — LAB REQUISITION (OUTPATIENT)
Dept: LAB | Facility: HOSPITAL | Age: 63
DRG: 868 | End: 2023-08-14
Payer: MEDICARE

## 2023-08-14 ENCOUNTER — HOSPITAL ENCOUNTER (OUTPATIENT)
Dept: GENERAL RADIOLOGY | Facility: HOSPITAL | Age: 63
Discharge: HOME | End: 2023-08-14
Payer: MEDICARE

## 2023-08-14 ENCOUNTER — TELEPHONE (OUTPATIENT)
Dept: INTERNAL MEDICINE | Facility: CLINIC | Age: 63
End: 2023-08-14

## 2023-08-14 ENCOUNTER — HOSPITAL ENCOUNTER (INPATIENT)
Facility: HOSPITAL | Age: 63
LOS: 3 days | Discharge: HOME | DRG: 868 | End: 2023-08-17
Attending: EMERGENCY MEDICINE | Admitting: INTERNAL MEDICINE
Payer: MEDICARE

## 2023-08-14 ENCOUNTER — OFFICE VISIT (OUTPATIENT)
Dept: INTERNAL MEDICINE | Facility: CLINIC | Age: 63
End: 2023-08-14
Payer: MEDICARE

## 2023-08-14 VITALS
BODY MASS INDEX: 25.76 KG/M2 | SYSTOLIC BLOOD PRESSURE: 110 MMHG | DIASTOLIC BLOOD PRESSURE: 80 MMHG | TEMPERATURE: 96.9 F | HEIGHT: 64 IN | WEIGHT: 150.9 LBS | HEART RATE: 86 BPM

## 2023-08-14 DIAGNOSIS — R14.0 ABDOMINAL BLOATING: Primary | ICD-10-CM

## 2023-08-14 DIAGNOSIS — D69.6 THROMBOCYTOPENIA: ICD-10-CM
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
## 2023-08-14 DIAGNOSIS — D70.9 NEUTROPENIA, UNSPECIFIED TYPE: ICD-10-CM

## 2023-08-14 DIAGNOSIS — R11.0 NAUSEA: ICD-10-CM

## 2023-08-14 DIAGNOSIS — R53.83 FATIGUE, UNSPECIFIED TYPE: ICD-10-CM

## 2023-08-14 DIAGNOSIS — D72.829 LEUKOCYTOSIS, UNSPECIFIED TYPE: Primary | ICD-10-CM

## 2023-08-14 DIAGNOSIS — R14.0 ABDOMINAL BLOATING: ICD-10-CM

## 2023-08-14 DIAGNOSIS — N17.9 ACUTE RENAL FAILURE, UNSPECIFIED ACUTE RENAL FAILURE TYPE: Primary | ICD-10-CM

## 2023-08-14 DIAGNOSIS — R51.9 ACUTE NONINTRACTABLE HEADACHE, UNSPECIFIED HEADACHE TYPE: ICD-10-CM

## 2023-08-14 PROBLEM — D72.825 BANDEMIA: Status: ACTIVE | Noted: 2023-08-14

## 2023-08-14 PROBLEM — I95.9 HYPOTENSION: Status: ACTIVE | Noted: 2023-08-14

## 2023-08-14 LAB
ALBUMIN SERPL-MCNC: 4.3 G/DL (ref 3.5–5.2)
ALBUMIN SERPL-MCNC: 4.4 G/DL (ref 3.5–5.2)
ALBUMIN/GLOB SERPL: 1.8 G/DL
ALBUMIN/GLOB SERPL: 1.8 G/DL
ALP SERPL-CCNC: 135 U/L (ref 39–117)
ALP SERPL-CCNC: 163 U/L (ref 39–117)
ALT SERPL W P-5'-P-CCNC: 64 U/L (ref 1–33)
ALT SERPL W P-5'-P-CCNC: 74 U/L (ref 1–33)
ANION GAP SERPL CALCULATED.3IONS-SCNC: 14 MMOL/L (ref 5–15)
ANION GAP SERPL CALCULATED.3IONS-SCNC: 14 MMOL/L (ref 5–15)
ANISOCYTOSIS BLD QL: ABNORMAL
APTT PPP: 25.2 SECONDS (ref 24.1–35)
ARTERIAL PATENCY WRIST A: ABNORMAL
AST SERPL-CCNC: 152 U/L (ref 1–32)
AST SERPL-CCNC: 180 U/L (ref 1–32)
ATMOSPHERIC PRESS: 747 MMHG
B PARAPERT DNA SPEC QL NAA+PROBE: NOT DETECTED
B PERT DNA SPEC QL NAA+PROBE: NOT DETECTED
BACTERIA UR QL AUTO: ABNORMAL /HPF
BASE EXCESS BLDA CALC-SCNC: -2.9 MMOL/L (ref 0–2)
BASOPHILS # BLD MANUAL: 0.03 10*3/MM3 (ref 0–0.2)
BASOPHILS NFR BLD MANUAL: 2 % (ref 0–1.5)
BDY SITE: ABNORMAL
BILIRUB SERPL-MCNC: 0.5 MG/DL (ref 0–1.2)
BILIRUB SERPL-MCNC: 0.5 MG/DL (ref 0–1.2)
BILIRUB UR QL STRIP: ABNORMAL
BODY TEMPERATURE: 37 C
BUN SERPL-MCNC: 16 MG/DL (ref 8–23)
BUN SERPL-MCNC: 19 MG/DL (ref 8–23)
BUN/CREAT SERPL: 10.6 (ref 7–25)
BUN/CREAT SERPL: 9.5 (ref 7–25)
C PNEUM DNA NPH QL NAA+NON-PROBE: NOT DETECTED
CA-I BLD-MCNC: 4.68 MG/DL (ref 4.6–5.4)
CALCIUM SPEC-SCNC: 8.9 MG/DL (ref 8.6–10.5)
CALCIUM SPEC-SCNC: 9.2 MG/DL (ref 8.6–10.5)
CHLORIDE SERPL-SCNC: 95 MMOL/L (ref 98–107)
CHLORIDE SERPL-SCNC: 97 MMOL/L (ref 98–107)
CK SERPL-CCNC: 84 U/L (ref 20–180)
CLARITY UR: ABNORMAL
CO2 SERPL-SCNC: 24 MMOL/L (ref 22–29)
CO2 SERPL-SCNC: 24 MMOL/L (ref 22–29)
COLOR UR: ABNORMAL
CREAT SERPL-MCNC: 1.68 MG/DL (ref 0.57–1)
CREAT SERPL-MCNC: 1.79 MG/DL (ref 0.57–1)
CRP SERPL-MCNC: 10.13 MG/DL (ref 0–0.5)
D-LACTATE SERPL-SCNC: 1.6 MMOL/L (ref 0.5–2)
DEPRECATED RDW RBC AUTO: 47.8 FL (ref 37–54)
DEPRECATED RDW RBC AUTO: 47.8 FL (ref 37–54)
EGFRCR SERPLBLD CKD-EPI 2021: 31.2 ML/MIN/1.73
EGFRCR SERPLBLD CKD-EPI 2021: 33.6 ML/MIN/1.73
ERYTHROCYTE [DISTWIDTH] IN BLOOD BY AUTOMATED COUNT: 13.6 % (ref 12.3–15.4)
ERYTHROCYTE [DISTWIDTH] IN BLOOD BY AUTOMATED COUNT: 13.6 % (ref 12.3–15.4)
FLUAV SUBTYP SPEC NAA+PROBE: NOT DETECTED
FLUBV RNA ISLT QL NAA+PROBE: NOT DETECTED
GIANT PLATELETS: ABNORMAL
GLOBULIN UR ELPH-MCNC: 2.4 GM/DL
GLOBULIN UR ELPH-MCNC: 2.5 GM/DL
GLUCOSE SERPL-MCNC: 122 MG/DL (ref 65–99)
GLUCOSE SERPL-MCNC: 122 MG/DL (ref 65–99)
GLUCOSE UR STRIP-MCNC: NEGATIVE MG/DL
HADV DNA SPEC NAA+PROBE: NOT DETECTED
HCO3 BLDA-SCNC: 21.5 MMOL/L (ref 20–26)
HCOV 229E RNA SPEC QL NAA+PROBE: NOT DETECTED
HCOV HKU1 RNA SPEC QL NAA+PROBE: NOT DETECTED
HCOV NL63 RNA SPEC QL NAA+PROBE: NOT DETECTED
HCOV OC43 RNA SPEC QL NAA+PROBE: NOT DETECTED
HCT VFR BLD AUTO: 38.8 % (ref 34–46.6)
HCT VFR BLD AUTO: 38.9 % (ref 34–46.6)
HGB BLD-MCNC: 12.2 G/DL (ref 12–15.9)
HGB BLD-MCNC: 12.3 G/DL (ref 12–15.9)
HGB UR QL STRIP.AUTO: NEGATIVE
HMPV RNA NPH QL NAA+NON-PROBE: NOT DETECTED
HOLD SPECIMEN: NORMAL
HOLD SPECIMEN: NORMAL
HPIV1 RNA ISLT QL NAA+PROBE: NOT DETECTED
HPIV2 RNA SPEC QL NAA+PROBE: NOT DETECTED
HPIV3 RNA NPH QL NAA+PROBE: NOT DETECTED
HPIV4 P GENE NPH QL NAA+PROBE: NOT DETECTED
HYALINE CASTS UR QL AUTO: ABNORMAL /LPF
INR PPP: 0.98 (ref 0.91–1.09)
KETONES UR QL STRIP: ABNORMAL
LEUKOCYTE ESTERASE UR QL STRIP.AUTO: ABNORMAL
LYMPHOCYTES # BLD MANUAL: 0.29 10*3/MM3 (ref 0.7–3.1)
LYMPHOCYTES NFR BLD MANUAL: 1 % (ref 5–12)
Lab: ABNORMAL
Lab: NORMAL
M PNEUMO IGG SER IA-ACNC: NOT DETECTED
MAGNESIUM SERPL-MCNC: 1.6 MG/DL (ref 1.6–2.4)
MCH RBC QN AUTO: 29.8 PG (ref 26.6–33)
MCH RBC QN AUTO: 30.1 PG (ref 26.6–33)
MCHC RBC AUTO-ENTMCNC: 31.4 G/DL (ref 31.5–35.7)
MCHC RBC AUTO-ENTMCNC: 31.6 G/DL (ref 31.5–35.7)
MCV RBC AUTO: 94.9 FL (ref 79–97)
MCV RBC AUTO: 95.1 FL (ref 79–97)
MICROCYTES BLD QL: ABNORMAL
MODALITY: ABNORMAL
MONOCYTES # BLD: 0.01 10*3/MM3 (ref 0.1–0.9)
MYELOCYTES NFR BLD MANUAL: 1 % (ref 0–0)
NEUTROPHILS # BLD AUTO: 1.05 10*3/MM3 (ref 1.7–7)
NEUTROPHILS NFR BLD MANUAL: 21.8 % (ref 42.7–76)
NEUTS BAND NFR BLD MANUAL: 53.5 % (ref 0–5)
NEUTS VAC BLD QL SMEAR: ABNORMAL
NITRITE UR QL STRIP: NEGATIVE
PCO2 BLDA: 35.4 MM HG (ref 35–45)
PCO2 TEMP ADJ BLD: 35.4 MM HG (ref 35–45)
PH BLDA: 7.39 PH UNITS (ref 7.35–7.45)
PH UR STRIP.AUTO: 5.5 [PH] (ref 5–8)
PH, TEMP CORRECTED: 7.39 PH UNITS (ref 7.35–7.45)
PHOSPHATE SERPL-MCNC: 4.3 MG/DL (ref 2.5–4.5)
PLATELET # BLD AUTO: 100 10*3/MM3 (ref 140–450)
PLATELET # BLD AUTO: 105 10*3/MM3 (ref 140–450)
PMV BLD AUTO: 11 FL (ref 6–12)
PMV BLD AUTO: 11 FL (ref 6–12)
PO2 BLDA: 87 MM HG (ref 83–108)
PO2 TEMP ADJ BLD: 87 MM HG (ref 83–108)
POTASSIUM SERPL-SCNC: 4.2 MMOL/L (ref 3.5–5.2)
POTASSIUM SERPL-SCNC: 4.2 MMOL/L (ref 3.5–5.2)
PROCALCITONIN SERPL-MCNC: 0.71 NG/ML (ref 0–0.25)
PROT SERPL-MCNC: 6.7 G/DL (ref 6–8.5)
PROT SERPL-MCNC: 6.9 G/DL (ref 6–8.5)
PROT UR QL STRIP: ABNORMAL
PROTHROMBIN TIME: 13.1 SECONDS (ref 11.8–14.8)
RBC # BLD AUTO: 4.09 10*6/MM3 (ref 3.77–5.28)
RBC # BLD AUTO: 4.09 10*6/MM3 (ref 3.77–5.28)
RBC # UR STRIP: ABNORMAL /HPF
REF LAB TEST METHOD: ABNORMAL
RENAL EPI CELLS #/AREA URNS HPF: ABNORMAL /HPF
RHINOVIRUS RNA SPEC NAA+PROBE: NOT DETECTED
RSV RNA NPH QL NAA+NON-PROBE: NOT DETECTED
SAO2 % BLDCOA: 97.6 % (ref 94–99)
SARS-COV-2 RNA NPH QL NAA+NON-PROBE: NOT DETECTED
SARS-COV-2 RNA RESP QL NAA+PROBE: NOT DETECTED
SMALL PLATELETS BLD QL SMEAR: ABNORMAL
SODIUM SERPL-SCNC: 133 MMOL/L (ref 136–145)
SODIUM SERPL-SCNC: 135 MMOL/L (ref 136–145)
SP GR UR STRIP: 1.02 (ref 1–1.03)
SQUAMOUS #/AREA URNS HPF: ABNORMAL /HPF
TRANS CELLS #/AREA URNS HPF: ABNORMAL /HPF
UROBILINOGEN UR QL STRIP: ABNORMAL
VARIANT LYMPHS NFR BLD MANUAL: 18.8 % (ref 19.6–45.3)
VARIANT LYMPHS NFR BLD MANUAL: 2 % (ref 0–5)
VENTILATOR MODE: ABNORMAL
WBC # UR STRIP: ABNORMAL /HPF
WBC NRBC COR # BLD: 1.31 10*3/MM3 (ref 3.4–10.8)
WBC NRBC COR # BLD: 1.39 10*3/MM3 (ref 3.4–10.8)
WHOLE BLOOD HOLD COAG: NORMAL
WHOLE BLOOD HOLD SPECIMEN: NORMAL

## 2023-08-14 PROCEDURE — 36415 COLL VENOUS BLD VENIPUNCTURE: CPT

## 2023-08-14 PROCEDURE — 25010000002 VANCOMYCIN 10 G RECONSTITUTED SOLUTION: Performed by: EMERGENCY MEDICINE

## 2023-08-14 PROCEDURE — 83605 ASSAY OF LACTIC ACID: CPT | Performed by: EMERGENCY MEDICINE

## 2023-08-14 PROCEDURE — 82330 ASSAY OF CALCIUM: CPT

## 2023-08-14 PROCEDURE — 87484 EHRLICHA CHAFFEENSIS AMP PRB: CPT

## 2023-08-14 PROCEDURE — 87635 SARS-COV-2 COVID-19 AMP PRB: CPT | Performed by: INTERNAL MEDICINE

## 2023-08-14 PROCEDURE — 25010000002 METHYLPREDNISOLONE PER 125 MG: Performed by: EMERGENCY MEDICINE

## 2023-08-14 PROCEDURE — 99214 OFFICE O/P EST MOD 30 MIN: CPT | Performed by: INTERNAL MEDICINE

## 2023-08-14 PROCEDURE — 74018 RADEX ABDOMEN 1 VIEW: CPT

## 2023-08-14 PROCEDURE — 93010 ELECTROCARDIOGRAM REPORT: CPT | Performed by: INTERNAL MEDICINE

## 2023-08-14 PROCEDURE — 87798 DETECT AGENT NOS DNA AMP: CPT

## 2023-08-14 PROCEDURE — 86140 C-REACTIVE PROTEIN: CPT | Performed by: EMERGENCY MEDICINE

## 2023-08-14 PROCEDURE — 86618 LYME DISEASE ANTIBODY: CPT | Performed by: EMERGENCY MEDICINE

## 2023-08-14 PROCEDURE — 85610 PROTHROMBIN TIME: CPT | Performed by: EMERGENCY MEDICINE

## 2023-08-14 PROCEDURE — P9612 CATHETERIZE FOR URINE SPEC: HCPCS

## 2023-08-14 PROCEDURE — 74176 CT ABD & PELVIS W/O CONTRAST: CPT

## 2023-08-14 PROCEDURE — 0202U NFCT DS 22 TRGT SARS-COV-2: CPT | Performed by: EMERGENCY MEDICINE

## 2023-08-14 PROCEDURE — 82803 BLOOD GASES ANY COMBINATION: CPT

## 2023-08-14 PROCEDURE — 71045 X-RAY EXAM CHEST 1 VIEW: CPT

## 2023-08-14 PROCEDURE — 85027 COMPLETE CBC AUTOMATED: CPT

## 2023-08-14 PROCEDURE — 87468 ANAPLSMA PHGCYTOPHLM AMP PRB: CPT | Performed by: EMERGENCY MEDICINE

## 2023-08-14 PROCEDURE — 82550 ASSAY OF CK (CPK): CPT | Performed by: EMERGENCY MEDICINE

## 2023-08-14 PROCEDURE — 93005 ELECTROCARDIOGRAM TRACING: CPT | Performed by: EMERGENCY MEDICINE

## 2023-08-14 PROCEDURE — 3079F DIAST BP 80-89 MM HG: CPT | Performed by: INTERNAL MEDICINE

## 2023-08-14 PROCEDURE — 84100 ASSAY OF PHOSPHORUS: CPT | Performed by: EMERGENCY MEDICINE

## 2023-08-14 PROCEDURE — 86618 LYME DISEASE ANTIBODY: CPT

## 2023-08-14 PROCEDURE — 85730 THROMBOPLASTIN TIME PARTIAL: CPT | Performed by: EMERGENCY MEDICINE

## 2023-08-14 PROCEDURE — 3074F SYST BP LT 130 MM HG: CPT | Performed by: INTERNAL MEDICINE

## 2023-08-14 PROCEDURE — 87798 DETECT AGENT NOS DNA AMP: CPT | Performed by: EMERGENCY MEDICINE

## 2023-08-14 PROCEDURE — 85025 COMPLETE CBC W/AUTO DIFF WBC: CPT | Performed by: EMERGENCY MEDICINE

## 2023-08-14 PROCEDURE — 80053 COMPREHEN METABOLIC PANEL: CPT | Performed by: EMERGENCY MEDICINE

## 2023-08-14 PROCEDURE — 36600 WITHDRAWAL OF ARTERIAL BLOOD: CPT

## 2023-08-14 PROCEDURE — 87484 EHRLICHA CHAFFEENSIS AMP PRB: CPT | Performed by: EMERGENCY MEDICINE

## 2023-08-14 PROCEDURE — 83036 HEMOGLOBIN GLYCOSYLATED A1C: CPT | Performed by: FAMILY MEDICINE

## 2023-08-14 PROCEDURE — 87468 ANAPLSMA PHGCYTOPHLM AMP PRB: CPT

## 2023-08-14 PROCEDURE — 83735 ASSAY OF MAGNESIUM: CPT | Performed by: EMERGENCY MEDICINE

## 2023-08-14 PROCEDURE — 85007 BL SMEAR W/DIFF WBC COUNT: CPT | Performed by: EMERGENCY MEDICINE

## 2023-08-14 PROCEDURE — 99285 EMERGENCY DEPT VISIT HI MDM: CPT

## 2023-08-14 PROCEDURE — 80053 COMPREHEN METABOLIC PANEL: CPT

## 2023-08-14 PROCEDURE — 81001 URINALYSIS AUTO W/SCOPE: CPT | Performed by: EMERGENCY MEDICINE

## 2023-08-14 PROCEDURE — 87040 BLOOD CULTURE FOR BACTERIA: CPT | Performed by: EMERGENCY MEDICINE

## 2023-08-14 PROCEDURE — 84145 PROCALCITONIN (PCT): CPT | Performed by: EMERGENCY MEDICINE

## 2023-08-14 RX ORDER — AMOXICILLIN 250 MG
2 CAPSULE ORAL 2 TIMES DAILY
Status: DISCONTINUED | OUTPATIENT
Start: 2023-08-14 | End: 2023-08-17 | Stop reason: HOSPADM

## 2023-08-14 RX ORDER — POLYETHYLENE GLYCOL 3350 17 G/17G
17 POWDER, FOR SOLUTION ORAL DAILY PRN
Status: DISCONTINUED | OUTPATIENT
Start: 2023-08-14 | End: 2023-08-17 | Stop reason: HOSPADM

## 2023-08-14 RX ORDER — BISACODYL 10 MG
10 SUPPOSITORY, RECTAL RECTAL DAILY PRN
Status: DISCONTINUED | OUTPATIENT
Start: 2023-08-14 | End: 2023-08-17 | Stop reason: HOSPADM

## 2023-08-14 RX ORDER — METHYLPREDNISOLONE SODIUM SUCCINATE 125 MG/2ML
80 INJECTION, POWDER, LYOPHILIZED, FOR SOLUTION INTRAMUSCULAR; INTRAVENOUS ONCE
Status: COMPLETED | OUTPATIENT
Start: 2023-08-14 | End: 2023-08-14

## 2023-08-14 RX ORDER — HYDROCODONE BITARTRATE AND ACETAMINOPHEN 5; 325 MG/1; MG/1
1 TABLET ORAL ONCE
Status: DISCONTINUED | OUTPATIENT
Start: 2023-08-14 | End: 2023-08-17 | Stop reason: HOSPADM

## 2023-08-14 RX ORDER — ACETAMINOPHEN 650 MG/1
650 SUPPOSITORY RECTAL EVERY 4 HOURS PRN
Status: DISCONTINUED | OUTPATIENT
Start: 2023-08-14 | End: 2023-08-17 | Stop reason: HOSPADM

## 2023-08-14 RX ORDER — ACETAMINOPHEN 325 MG/1
650 TABLET ORAL EVERY 6 HOURS PRN
Status: DISCONTINUED | OUTPATIENT
Start: 2023-08-14 | End: 2023-08-17 | Stop reason: HOSPADM

## 2023-08-14 RX ORDER — SODIUM CHLORIDE 0.9 % (FLUSH) 0.9 %
10 SYRINGE (ML) INJECTION AS NEEDED
Status: DISCONTINUED | OUTPATIENT
Start: 2023-08-14 | End: 2023-08-17 | Stop reason: HOSPADM

## 2023-08-14 RX ORDER — SODIUM CHLORIDE 9 MG/ML
40 INJECTION, SOLUTION INTRAVENOUS AS NEEDED
Status: DISCONTINUED | OUTPATIENT
Start: 2023-08-14 | End: 2023-08-17 | Stop reason: HOSPADM

## 2023-08-14 RX ORDER — SODIUM CHLORIDE, SODIUM LACTATE, POTASSIUM CHLORIDE, CALCIUM CHLORIDE 600; 310; 30; 20 MG/100ML; MG/100ML; MG/100ML; MG/100ML
100 INJECTION, SOLUTION INTRAVENOUS CONTINUOUS
Status: DISCONTINUED | OUTPATIENT
Start: 2023-08-14 | End: 2023-08-17 | Stop reason: HOSPADM

## 2023-08-14 RX ORDER — FAMOTIDINE 10 MG/ML
20 INJECTION, SOLUTION INTRAVENOUS
Status: DISCONTINUED | OUTPATIENT
Start: 2023-08-14 | End: 2023-08-16 | Stop reason: ALTCHOICE

## 2023-08-14 RX ORDER — SODIUM CHLORIDE 0.9 % (FLUSH) 0.9 %
10 SYRINGE (ML) INJECTION EVERY 12 HOURS SCHEDULED
Status: DISCONTINUED | OUTPATIENT
Start: 2023-08-14 | End: 2023-08-17 | Stop reason: HOSPADM

## 2023-08-14 RX ORDER — ONDANSETRON 2 MG/ML
4 INJECTION INTRAMUSCULAR; INTRAVENOUS EVERY 6 HOURS PRN
Status: DISCONTINUED | OUTPATIENT
Start: 2023-08-14 | End: 2023-08-17 | Stop reason: HOSPADM

## 2023-08-14 RX ORDER — BISACODYL 5 MG/1
5 TABLET, DELAYED RELEASE ORAL DAILY PRN
Status: DISCONTINUED | OUTPATIENT
Start: 2023-08-14 | End: 2023-08-17 | Stop reason: HOSPADM

## 2023-08-14 RX ADMIN — VANCOMYCIN HYDROCHLORIDE 1250 MG: 10 INJECTION, POWDER, LYOPHILIZED, FOR SOLUTION INTRAVENOUS at 18:34

## 2023-08-14 RX ADMIN — Medication 10 ML: at 22:49

## 2023-08-14 RX ADMIN — DOXYCYCLINE 100 MG: 100 INJECTION, POWDER, LYOPHILIZED, FOR SOLUTION INTRAVENOUS at 18:42

## 2023-08-14 RX ADMIN — METHYLPREDNISOLONE SODIUM SUCCINATE 80 MG: 125 INJECTION, POWDER, LYOPHILIZED, FOR SOLUTION INTRAMUSCULAR; INTRAVENOUS at 18:29

## 2023-08-14 RX ADMIN — FAMOTIDINE 20 MG: 10 INJECTION INTRAVENOUS at 22:47

## 2023-08-14 RX ADMIN — SODIUM CHLORIDE, POTASSIUM CHLORIDE, SODIUM LACTATE AND CALCIUM CHLORIDE 1000 ML: 600; 310; 30; 20 INJECTION, SOLUTION INTRAVENOUS at 17:46

## 2023-08-14 RX ADMIN — SODIUM CHLORIDE, POTASSIUM CHLORIDE, SODIUM LACTATE AND CALCIUM CHLORIDE 1000 ML: 600; 310; 30; 20 INJECTION, SOLUTION INTRAVENOUS at 18:33

## 2023-08-14 RX ADMIN — DOCUSATE SODIUM 50 MG AND SENNOSIDES 8.6 MG 2 TABLET: 8.6; 5 TABLET, FILM COATED ORAL at 22:47

## 2023-08-14 RX ADMIN — ACETAMINOPHEN 650 MG: 325 TABLET, FILM COATED ORAL at 23:18

## 2023-08-14 RX ADMIN — SODIUM CHLORIDE, POTASSIUM CHLORIDE, SODIUM LACTATE AND CALCIUM CHLORIDE 100 ML/HR: 600; 310; 30; 20 INJECTION, SOLUTION INTRAVENOUS at 22:47

## 2023-08-14 NOTE — H&P
[facility] Medicine Services  HISTORY AND PHYSICAL    Date of Admission: 8/14/2023  Primary Care Physician: ANDRÉS Louis MD    Subjective   Primary Historian: Patient and     Chief Complaint: Nausea vomiting/abdomen pain/weakness.    History of Present Illness  Patient is a 63-year-old present to ER with complaint of generalized weakness and fatigue and lightheadedness.  Patient primary care physician is Dr. Samson alvarado . patient has several month history of nonspecific abdomen pain and bloating.  Patient also history of 3 tick bite over the past 6 weeks.  Last tick bite was 2 weeks ago per patient.  Patient had some blood work and a tick panel was already sent.    Laboratory shows neutropenia and thrombocytopenia.  Patient was hypotensive.  Patient is on room air.  Sodium is 133.  Renal insufficiency, creatinine is 1.79.  Elevated C-reactive protein-10.  Procalcitonin 0.71.  Neutropenia 1.39.  Thrombocytopenia, platelet counts 100 . absolute neutrophil-1.05.  Band 53%.    Patient has a past medical history of allergic rhinitis, arthritis, COPD, CAD, reflux, hypertension, hyperlipidemia, MI, shingles.    Review of Systems   Constitutional:  Positive for activity change, appetite change and fatigue. Negative for chills and fever.   HENT:  Negative for hearing loss, nosebleeds, tinnitus and trouble swallowing.    Eyes:  Negative for visual disturbance.   Respiratory:  Negative for cough, chest tightness, shortness of breath and wheezing.    Cardiovascular:  Negative for chest pain, palpitations and leg swelling.   Gastrointestinal:  Positive for nausea. Negative for abdominal distention, abdominal pain, blood in stool, constipation, diarrhea and vomiting.   Endocrine: Negative for cold intolerance, heat intolerance, polydipsia, polyphagia and polyuria.   Genitourinary:  Negative for decreased urine volume, difficulty urinating, dysuria, flank pain, frequency and  hematuria.   Musculoskeletal:  Negative for arthralgias, joint swelling and myalgias.   Skin:  Negative for rash.   Allergic/Immunologic: Negative for immunocompromised state.   Neurological:  Positive for weakness. Negative for dizziness, syncope, light-headedness and headaches.   Hematological:  Negative for adenopathy. Does not bruise/bleed easily.   Psychiatric/Behavioral:  Negative for confusion and sleep disturbance. The patient is not nervous/anxious.       Otherwise complete ROS reviewed and negative except as mentioned in the HPI.    Past Medical History:   Past Medical History:   Diagnosis Date    Abnormal Pap smear of cervix     Allergic     Arthritis     COPD (chronic obstructive pulmonary disease)     Coronary artery disease 2012    GERD (gastroesophageal reflux disease)     Hyperlipidemia     Hypertension     Myocardial infarction     Shingles      Past Surgical History:  Past Surgical History:   Procedure Laterality Date    COLONOSCOPY  2021    TUBAL ABDOMINAL LIGATION      WISDOM TOOTH EXTRACTION       Social History:  reports that she has quit smoking. Her smoking use included cigarettes. She has a 40.00 pack-year smoking history. She has never used smokeless tobacco. She reports current alcohol use of about 10.0 standard drinks per week. She reports that she does not use drugs.    Family History: family history includes Alcohol abuse in her brother, father, and maternal grandfather; Arthritis in her father; Breast cancer (age of onset: 55) in her cousin and cousin; Breast cancer (age of onset: 58) in her cousin; Breast cancer (age of onset: 59) in her sister; COPD in her brother; Cancer in her brother, brother, father, paternal grandmother, and sister; Diabetes in her father and maternal grandmother; Early death in her brother and mother; Hearing loss in her father; Heart attack in her father and mother; Heart disease in her brother, father, and mother; Hyperlipidemia in her brother, father, and  "mother; Hypertension in her brother, brother, father, mother, and sister; Ovarian cancer (age of onset: 65) in her paternal grandmother; Prostate cancer in her father; Stroke in her maternal grandmother.       Allergies:  Allergies   Allergen Reactions    Codeine Shortness Of Breath    Adhesive Tape Itching     Redness and itching     Bactrim [Sulfamethoxazole-Trimethoprim] Other (See Comments)     BURN TONGUE    Statins Other (See Comments)     ELEVATED LIVER ENZYMES       Medications:  Prior to Admission medications    Medication Sig Start Date End Date Taking? Authorizing Provider   aspirin 81 MG EC tablet Take 1 tablet by mouth Daily. 3/6/23   ANDRÉS Louis MD   carvedilol (COREG) 25 MG tablet Take 1 tablet by mouth 2 (Two) Times a Day With Meals. 6/14/23   ANDRÉS Louis MD   fluticasone (Flonase) 50 MCG/ACT nasal spray 2 sprays into the nostril(s) as directed by provider Daily. 10/11/22   Shagufta Torres APRN   ibuprofen (ADVIL,MOTRIN) 200 MG tablet Take 3 tablets by mouth As Needed for Mild Pain.    Provider, MD Mario   lansoprazole (PREVACID) 30 MG capsule Take 1 capsule by mouth Daily. 11/28/22   ANDRÉS Louis MD   lisinopril (PRINIVIL,ZESTRIL) 10 MG tablet Take 1 tablet by mouth 2 (Two) Times a Day. 6/9/23   ANDRÉS Louis MD   rosuvastatin (CRESTOR) 5 MG tablet Take 1 tablet by mouth Daily. 6/9/23   ANDRÉS Louis MD   ezetimibe (ZETIA) 10 MG tablet Take 1 tablet by mouth Daily. 1/20/23 8/14/23  Shagufta Torres APRN     I have utilized all available immediate resources to obtain, update, or review the patient's current medications (including all prescriptions, over-the-counter products, herbals, cannabis/cannabidiol products, and vitamin/mineral/dietary (nutritional) supplements).    Objective     Vital Signs: BP (!) 81/55   Pulse 86   Temp 97.6 øF (36.4 øC)   Resp 18   Ht 160 cm (63\")   Wt 68 kg (150 lb)   LMP  (LMP Unknown)   SpO2 99%   BMI 26.57 kg/mý   Physical " Exam  Vitals and nursing note reviewed.   HENT:      Head: Normocephalic and atraumatic.   Eyes:      Conjunctiva/sclera: Conjunctivae normal.      Pupils: Pupils are equal, round, and reactive to light.   Neck:      Vascular: No JVD.   Cardiovascular:      Rate and Rhythm: Normal rate and regular rhythm.      Heart sounds: Normal heart sounds.   Pulmonary:      Effort: Pulmonary effort is normal. No respiratory distress.      Breath sounds: Normal breath sounds. No wheezing or rales.   Chest:      Chest wall: No tenderness.   Abdominal:      General: Bowel sounds are normal. There is no distension.      Palpations: Abdomen is soft.      Tenderness: There is no abdominal tenderness.   Musculoskeletal:         General: No tenderness or deformity. Normal range of motion.      Cervical back: Neck supple.   Skin:     General: Skin is warm and dry.      Capillary Refill: Capillary refill takes 2 to 3 seconds.      Findings: No rash.   Neurological:      Mental Status: She is alert and oriented to person, place, and time.      Cranial Nerves: No cranial nerve deficit.      Motor: Weakness present. No abnormal muscle tone.      Deep Tendon Reflexes: Reflexes normal.   Psychiatric:         Mood and Affect: Mood normal.         Behavior: Behavior normal.         Thought Content: Thought content normal.         Judgment: Judgment normal.            Results Reviewed:  Lab Results (last 24 hours)       Procedure Component Value Units Date/Time    Respiratory Panel PCR w/COVID-19(SARS-CoV-2) MK/SUZETTE/FLORENTINO/PAD/COR/MAD/RADHA In-House, NP Swab in UTM/VTM, 3-4 HR TAT - Swab, Nasopharynx [[id number]] Collected: 08/14/23 1832    Specimen: Swab from Nasopharynx Updated: 08/14/23 1852    Lyme Disease Total Antibody With Reflex to Immunoassay [[id number]] Collected: 08/14/23 1826    Specimen: Blood Updated: 08/14/23 1839    Ehrlichia Profile DNA PCR [[id number]] Collected: 08/14/23 1826    Specimen: Blood Updated: 08/14/23 1839    Rickettsia  "Species DNA, Real-Time PCR [[id number]] Collected: 08/14/23 1826    Specimen: Blood Updated: 08/14/23 1839    CK [[id number]]  (Normal) Collected: 08/14/23 1659    Specimen: Blood Updated: 08/14/23 1825     Creatine Kinase 84 U/L     Procalcitonin [[id number]]  (Abnormal) Collected: 08/14/23 1659    Specimen: Blood Updated: 08/14/23 1825     Procalcitonin 0.71 ng/mL     Narrative:      As a Marker for Sepsis (Non-Neonates):    1. <0.5 ng/mL represents a low risk of severe sepsis and/or septic shock.  2. >2 ng/mL represents a high risk of severe sepsis and/or septic shock.    As a Marker for Lower Respiratory Tract Infections that require antibiotic therapy:    PCT on Admission    Antibiotic Therapy       6-12 Hrs later    >0.5                Strongly Recommended  >0.25 - <0.5        Recommended   0.1 - 0.25          Discouraged              Remeasure/reassess PCT  <0.1                Strongly Discouraged     Remeasure/reassess PCT    As 28 day mortality risk marker: \"Change in Procalcitonin Result\" (>80% or <=80%) if Day 0 (or Day 1) and Day 4 values are available. Refer to http://www.[employer] US[facility]ist of [place] hospitals according to the [facility]-pct-calculator.com    Change in PCT <=80%  A decrease of PCT levels below or equal to 80% defines a positive change in PCT test result representing a higher risk for 28-day all-cause mortality of patients diagnosed with severe sepsis for septic shock.    Change in PCT >80%  A decrease of PCT levels of more than 80% defines a negative change in PCT result representing a lower risk for 28-day all-cause mortality of patients diagnosed with severe sepsis or septic shock.       [place] Draw [[id number]] Collected: 08/14/23 1659    Specimen: Blood Updated: 08/14/23 1800    Narrative:      The following orders were created for panel order [place] Draw.  Procedure                               Abnormality         Status                     ---------                               -----------         ------                     Green Top " (Gel)[[id number]]                                  Final result               Lavender Top[[id number]]                                     Final result               Red Top[[id number]]                                          Final result               Light Blue Top[[id number]]                                   Final result                 Please view results for these tests on the individual orders.    Light Blue Top [[id number]] Collected: 08/14/23 1659    Specimen: Blood Updated: 08/14/23 1800     Extra Tube Hold for add-ons.     Comment: Auto resulted       Green Top (Gel) [[id number]] Collected: 08/14/23 1659    Specimen: Blood Updated: 08/14/23 1800     Extra Tube Hold for add-ons.     Comment: Auto resulted.       Lavender Top [[id number]] Collected: 08/14/23 1659    Specimen: Blood Updated: 08/14/23 1800     Extra Tube hold for add-on     Comment: Auto resulted       Red Top [[id number]] Collected: 08/14/23 1659    Specimen: Blood Updated: 08/14/23 1800     Extra Tube Hold for add-ons.     Comment: Auto resulted.       Manual Differential [[id number]]  (Abnormal) Collected: 08/14/23 1659    Specimen: Blood Updated: 08/14/23 1747     Neutrophil % 21.8 %      Lymphocyte % 18.8 %      Monocyte % 1.0 %      Basophil % 2.0 %      Bands %  53.5 %      Myelocyte % 1.0 %      Atypical Lymphocyte % 2.0 %      Neutrophils Absolute 1.05 10*3/mm3      Lymphocytes Absolute 0.29 10*3/mm3      Monocytes Absolute 0.01 10*3/mm3      Basophils Absolute 0.03 10*3/mm3      Anisocytosis Slight/1+     Microcytes Slight/1+     Vacuolated Neutrophils Slight/1+     Platelet Estimate Decreased     Giant Platelets Large/3+    Magnesium [[id number]]  (Normal) Collected: 08/14/23 1659    Specimen: Blood Updated: 08/14/23 1736     Magnesium 1.6 mg/dL     C-reactive Protein [[id number]]  (Abnormal) Collected: 08/14/23 1659    Specimen: Blood Updated: 08/14/23 1736     C-Reactive Protein 10.13 mg/dL     Comprehensive Metabolic Panel [[id number]]   (Abnormal) Collected: 08/14/23 1659    Specimen: Blood Updated: 08/14/23 1736     Glucose 122 mg/dL      BUN 19 mg/dL      Creatinine 1.79 mg/dL      Sodium 133 mmol/L      Potassium 4.2 mmol/L      Chloride 95 mmol/L      CO2 24.0 mmol/L      Calcium 8.9 mg/dL      Total Protein 6.9 g/dL      Albumin 4.4 g/dL      ALT (SGPT) 74 U/L      AST (SGOT) 180 U/L      Alkaline Phosphatase 163 U/L      Total Bilirubin 0.5 mg/dL      Globulin 2.5 gm/dL      A/G Ratio 1.8 g/dL      BUN/Creatinine Ratio 10.6     Anion Gap 14.0 mmol/L      eGFR 31.2 mL/min/1.73     Narrative:      GFR Normal >60  Chronic Kidney Disease <60  Kidney Failure <15      Urinalysis With Culture If Indicated - Urine, Catheter [[id number]]  (Abnormal) Collected: 08/14/23 1652    Specimen: Urine, Catheter Updated: 08/14/23 1734     Color, UA Dark Yellow     Appearance, UA Cloudy     pH, UA 5.5     Specific Gravity, UA 1.020     Glucose, UA Negative     Ketones, UA Trace     Bilirubin, UA Small (1+)     Blood, UA Negative     Protein, UA 30 mg/dL (1+)     Leuk Esterase, UA Small (1+)     Nitrite, UA Negative     Urobilinogen, UA 1.0 E.U./dL    Narrative:      In absence of clinical symptoms, the presence of pyuria, bacteria, and/or nitrites on the urinalysis result does not correlate with infection.    Urinalysis, Microscopic Only - Urine, Catheter [[id number]]  (Abnormal) Collected: 08/14/23 1652    Specimen: Urine, Catheter Updated: 08/14/23 1734     RBC, UA None Seen /HPF      WBC, UA 3-5 /HPF      Comment: Urine culture not indicated.        Bacteria, UA Trace /HPF      Squamous Epithelial Cells, UA 0-2 /HPF      Transitional Epithelial Cells, UA 7-12 /HPF      Renal Epithelial Cells, UA 0-2 /HPF      Hyaline Casts, UA None Seen /LPF      Methodology Manual Light Microscopy    Phosphorus [[id number]]  (Normal) Collected: 08/14/23 1659    Specimen: Blood Updated: 08/14/23 1733     Phosphorus 4.3 mg/dL     Lactic Acid, Plasma [[id number]]  (Normal)  Collected: 08/14/23 1659    Specimen: Blood Updated: 08/14/23 1732     Lactate 1.6 mmol/L     Protime-INR [[id number]]  (Normal) Collected: 08/14/23 1659    Specimen: Blood Updated: 08/14/23 1729     Protime 13.1 Seconds      INR 0.98    aPTT [[id number]]  (Normal) Collected: 08/14/23 1659    Specimen: Blood Updated: 08/14/23 1729     PTT 25.2 seconds     Blood Culture - Blood, Arm, Right [[id number]] Collected: 08/14/23 1718    Specimen: Blood from Arm, Right Updated: 08/14/23 1728    CBC & Differential [[id number]]  (Abnormal) Collected: 08/14/23 1659    Specimen: Blood Updated: 08/14/23 1727    Narrative:      The following orders were created for panel order CBC & Differential.  Procedure                               Abnormality         Status                     ---------                               -----------         ------                     CBC Auto Differential[[id number]]        Abnormal            Final result                 Please view results for these tests on the individual orders.    CBC Auto Differential [[id number]]  (Abnormal) Collected: 08/14/23 1659    Specimen: Blood Updated: 08/14/23 1727     WBC 1.39 10*3/mm3      RBC 4.09 10*6/mm3      Hemoglobin 12.3 g/dL      Hematocrit 38.9 %      MCV 95.1 fL      MCH 30.1 pg      MCHC 31.6 g/dL      RDW 13.6 %      RDW-SD 47.8 fl      MPV 11.0 fL      Platelets 100 10*3/mm3     Blood Culture - Blood, Arm, Left [[id number]] Collected: 08/14/23 1659    Specimen: Blood from Arm, Left Updated: 08/14/23 1716    Calcium, Ionized [[id number]] Collected: 08/14/23 1658    Specimen: Blood Updated: 08/14/23 1659     Ionized Calcium 4.68 mg/dL      Collected by [id number]     Comment: Meter: [id number]     :  [id number]       Blood Gas, Arterial - [[id number]]  (Abnormal) Collected: 08/14/23 1657    Specimen: Arterial Blood Updated: 08/14/23 1658     Site Right Brachial     To's Test N/A     pH, Arterial 7.392 pH units      pCO2, Arterial 35.4 mm Hg       pO2, Arterial 87.0 mm Hg      HCO3, Arterial 21.5 mmol/L      Base Excess, Arterial -2.9 mmol/L      Comment: 84 Value below reference range        O2 Saturation, Arterial 97.6 %      Temperature 37.0 C      Barometric Pressure for Blood Gas 747 mmHg      Modality Room Air     Ventilator Mode NA     Collected by [id number]     Comment: Meter: [id number]     :  [id number]        pCO2, Temperature Corrected 35.4 mm Hg      pH, Temp Corrected 7.392 pH Units      pO2, Temperature Corrected 87.0 mm Hg           Imaging Results (Last 24 Hours)       Procedure Component Value Units Date/Time    CT Abdomen Pelvis Without Contrast [[id number]] Resulted: 08/14/23 1903     Updated: 08/14/23 1912    XR Chest 1 View [[id number]] Collected: 08/14/23 1802     Updated: 08/14/23 1806    Narrative:      EXAMINATION: Chest 1 view 08/14/2023     HISTORY: Severe sepsis.     FINDINGS: Upright frontal projection of the chest demonstrates mild left  basilar atelectasis. Lungs are otherwise clear. There is no effusion or  free air present.       Impression:      1.. Mild left basilar atelectasis. Lungs are otherwise clear.  This report was finalized on 08/14/2023 18:03 by Dr. Phuc Welsh MD.          I have personally reviewed and interpreted the radiology studies and ECG obtained at time of admission.     Assessment / Plan   Assessment:   Active Hospital Problems    Diagnosis     **Acute renal failure     Hypotension     Tick bite     Bandemia     Thrombocytopenia     Neutropenia        Treatment Plan  The patient will be admitted to my service here at [facility].     Neutropenia/thrombocytopenia/bandemia/history of tick bite.  Panel was ordered by her primary care physician and pending.  Patient received 80 mg of Solu-Medrol in ER.  Patient received 2 L bolus in ER.  Infectious disease consult.  Doxycycline .  Vancomycin.  Chest x-ray-Mild left basilar atelectasis, Lungs are otherwise clear.     Hypotension.  IV  hydration.  History of hypertension hyperlipidemia.  Hold Crestor due to thrombocytopenia.  Hold lisinopril, and Coreg due to hypotension.  Hold aspirin due to thrombocytopenia.    Nausea/vomiting/abdomen pain.  Pepcid.  Zofran as needed.  KUB-Mild gaseous distention of the small bowel and colon, No evidence of  bowel obstruction.  CT scan abdomen pelvic pending without contrast.    Acute renal failure.  Previous creatinine 0.084 6/7/2023.  Creatinine today is 1.79.  IV hydration.    Arthritis.  Hold Motrin due to renal failure.    Blood cultures pending.  Respiratory PCR pending.  COVID-19-negative.    Medical Decision Making  Number and Complexity of problems: History of tick bites/neutropenia/thrombocytopenia/bandemia/hypertension/nausea vomiting and abdomen pain.  Differential Diagnosis: None.    Conditions and Status        Condition is unchanged.     [facility] Data  External documents reviewed: ER notes  Cardiac tracing (EKG, telemetry) interpretation: Sinus  Radiology interpretation: CT scan  Labs reviewed: Laboratory  Any tests that were considered but not ordered: Lab in a.m.      Decision rules/scores evaluated (example [id number], Wells, etc): None     Discussed with: Patient and      Care Planning  Shared decision making: Patient and   Code status and discussions: Full code    Disposition  Social Determinants of Health that impact treatment or disposition: From home  Estimated length of stay is 2 to 5 days.     I confirmed that the patient's advanced care plan is present, code status is documented, and a surrogate decision maker is listed in the patient's medical record.     The patient's surrogate decision maker is .     The patient was seen and examined by me on 8/14/2023 at 1910..    Electronically signed by Rasheed Rodriguez MD, 08/14/23, 19:13 CDT.

## 2023-08-14 NOTE — Clinical Note
Level of Care: Remote Telemetry [26]   Diagnosis: Acute renal failure [508166]   Admitting Physician: MARIAM ENGLISH [[id number]]   Attending Physician: MARIAM ENGLISH [[id number]]   Certification: I Certify That Inpatient Hospital Services Are Medically Necessary For Greater Than 2 Midnights

## 2023-08-14 NOTE — PROGRESS NOTES
"Chief Complaint   Patient presents with    Abdominal Pain     Reports stomach bloating, discomfort, and a \"lot of gas\"    Neck Pain         History:  Melony Rincon is a 63 y.o. female who presents today for evaluation of the above problems.      Melony presents today for an acute visit.  She has had symptoms for the last 1 week initially started with stiff neck, neck pain and increasing headaches.  For the last 1 week she has had \"extreme gastro issues\".  She has had belching for the last 1 week and abdominal distention and bloating that is waking her up from sleep around 3 AM to 5 AM.  She is passing flatus but not as much as she feels she needs to.  Her last bowel movement was yesterday.  She does report having a tubal ligation years ago but otherwise no abdominal surgeries.    She has not had anything to eat since yesterday at 1:30 PM she does not have any appetite.    She is also having pain all over her body including her lower back which is rated a 9 out of 10.    For the last few days she is taking Motrin, but her abdominal issues started prior to Motrin.    She has had chills and temperature of 99 last night.  Denies any cough.  She did take a home COVID-19 test which was negative.    She reports having 3 tick bites in the last 6 weeks and believes they were attached for less than 48 hours.    She has had severe fatigue and weakness in her arms.    HPI      ROS:  Review of Systems    As above      Current Outpatient Medications:     aspirin 81 MG EC tablet, Take 1 tablet by mouth Daily., Disp: , Rfl:     carvedilol (COREG) 25 MG tablet, Take 1 tablet by mouth 2 (Two) Times a Day With Meals., Disp: 60 tablet, Rfl: 5    fluticasone (Flonase) 50 MCG/ACT nasal spray, 2 sprays into the nostril(s) as directed by provider Daily., Disp: 16 g, Rfl: 0    ibuprofen (ADVIL,MOTRIN) 200 MG tablet, Take 3 tablets by mouth As Needed for Mild Pain., Disp: , Rfl:     lansoprazole (PREVACID) 30 MG capsule, Take 1 capsule by " "mouth Daily., Disp: 90 capsule, Rfl: 3    lisinopril (PRINIVIL,ZESTRIL) 10 MG tablet, Take 1 tablet by mouth 2 (Two) Times a Day., Disp: 60 tablet, Rfl: 0    rosuvastatin (CRESTOR) 5 MG tablet, Take 1 tablet by mouth Daily., Disp: 30 tablet, Rfl: 5    Lab Results   Component Value Date    GLUCOSE 104 (H) 06/07/2023    BUN 13 06/07/2023    CREATININE 0.84 06/07/2023    EGFR 77.2 06/07/2023    BCR 15.5 06/07/2023    K 4.1 06/07/2023    CO2 29.0 06/07/2023    CALCIUM 9.2 06/07/2023    ALBUMIN 4.3 06/07/2023    BILITOT 0.5 06/07/2023    AST 28 06/07/2023    ALT 19 06/07/2023       WBC   Date Value Ref Range Status   03/14/2023 4.20 3.40 - 10.80 10*3/mm3 Final     RBC   Date Value Ref Range Status   03/14/2023 4.06 3.77 - 5.28 10*6/mm3 Final     Hemoglobin   Date Value Ref Range Status   03/14/2023 13.0 12.0 - 15.9 g/dL Final     Hematocrit   Date Value Ref Range Status   03/14/2023 38.3 34.0 - 46.6 % Final     MCV   Date Value Ref Range Status   03/14/2023 94.3 79.0 - 97.0 fL Final     MCH   Date Value Ref Range Status   03/14/2023 32.0 26.6 - 33.0 pg Final     MCHC   Date Value Ref Range Status   03/14/2023 33.9 31.5 - 35.7 g/dL Final     RDW   Date Value Ref Range Status   03/14/2023 12.8 12.3 - 15.4 % Final     RDW-SD   Date Value Ref Range Status   03/14/2023 43.2 37.0 - 54.0 fl Final     MPV   Date Value Ref Range Status   03/14/2023 11.4 6.0 - 12.0 fL Final     Platelets   Date Value Ref Range Status   03/14/2023 190 140 - 450 10*3/mm3 Final         OBJECTIVE:  Visit Vitals  /80 (BP Location: Left arm, Patient Position: Sitting, Cuff Size: Adult)   Pulse 86   Temp 96.9 øF (36.1 øC) (Temporal)   Ht 162.6 cm (64\")   Wt 68.4 kg (150 lb 14.4 oz)   LMP  (LMP Unknown)   BMI 25.90 kg/mý      Physical Exam  Vitals and nursing note reviewed.   Constitutional:       General: She is not in acute distress.     Appearance: She is well-developed. She is ill-appearing. She is not toxic-appearing or diaphoretic.      " Comments: Appears ill and uncomfortable   HENT:      Head: Normocephalic and atraumatic.   Eyes:      Pupils: Pupils are equal, round, and reactive to light.   Neck:      Thyroid: No thyromegaly.      Trachea: Phonation normal.   Cardiovascular:      Rate and Rhythm: Normal rate.   Pulmonary:      Effort: No respiratory distress.   Abdominal:      General: Bowel sounds are normal. There is distension (Mild, not tympanic).      Palpations: Abdomen is soft.      Tenderness: generalized  and tenderness in the left lower quadrant   Skin:     Coloration: Skin is not pale.      Findings: No erythema.   Neurological:      Mental Status: She is alert.   Psychiatric:         Behavior: Behavior normal.         Thought Content: Thought content normal.         Judgment: Judgment normal.       Assessment/Plan    Diagnoses and all orders for this visit:    1. Abdominal bloating (Primary)  -     XR Abdomen KUB; Future  -     COVID PRE-OP / PRE-PROCEDURE SCREENING ORDER (NO ISOLATION) - Swab, Nasopharynx; Future    2. Nausea  -     COVID PRE-OP / PRE-PROCEDURE SCREENING ORDER (NO ISOLATION) - Swab, Nasopharynx; Future    3. Acute nonintractable headache, unspecified headache type  -     COVID PRE-OP / PRE-PROCEDURE SCREENING ORDER (NO ISOLATION) - Swab, Nasopharynx; Future    4. Tick bite, unspecified site, initial encounter  -     CBC (No Diff); Future  -     Comprehensive metabolic panel; Future  -     Tick Panel; Future    5. Fatigue, unspecified type  -     CBC (No Diff); Future  -     Comprehensive metabolic panel; Future      First, would like to check a KUB given her symptoms of bloating and nausea.    Like to test for COVID-19 as many of her symptoms can be consistent with this.    Given her recent tick bites we will check tick panel, CBC and CMP.    Further work-up based on her of the above results and her symptoms.    Return if symptoms worsen or fail to improve.      VICTORIA Louis MD  13:24 CDT  8/14/2023

## 2023-08-14 NOTE — ED PROVIDER NOTES
Subjective   History of Present Illness  63-year-old female presents to the ED with complaint of generalized weakness fatigue, lightheadedness, near syncope, abnormal labs.  History of hypertension, hyperlipidemia, coronary disease, COPD.  Patient followed by Dr. Louis.  She has had several month history of nonspecific abdominal pain and bloating.  Also reports 3 tick bites over the past 6 weeks, last one occurring 2 weeks ago.  Went to see her primary doctor today, had some blood work obtained and tick panel sent.  Was contacted by office staff and told to come to the ED due to abnormal labs.  Labs were reviewed, patient has neutropenia and thrombocytopenia.  No reports of fever, chills, cough, sore throat, rhinorrhea.  Patient does states she has had an episodes of lightheadedness and states she felt she might pass out earlier today when walking to her doctor.  Blood pressure up to the ED 81/55, heart rate 86.    History provided by:  Patient    Review of Systems   All other systems reviewed and are negative.    Past Medical History:   Diagnosis Date    Abnormal Pap smear of cervix     Allergic     Arthritis     COPD (chronic obstructive pulmonary disease)     Coronary artery disease 2012    GERD (gastroesophageal reflux disease)     Hyperlipidemia     Hypertension     Myocardial infarction     Shingles        Allergies   Allergen Reactions    Codeine Shortness Of Breath    Adhesive Tape Itching     Redness and itching     Bactrim [Sulfamethoxazole-Trimethoprim] Other (See Comments)     BURN TONGUE    Statins Other (See Comments)     ELEVATED LIVER ENZYMES       Past Surgical History:   Procedure Laterality Date    COLONOSCOPY  2021    TUBAL ABDOMINAL LIGATION      WISDOM TOOTH EXTRACTION         Family History   Problem Relation Age of Onset    Heart attack Mother     Hypertension Mother     Early death Mother     Heart disease Mother     Hyperlipidemia Mother     Prostate cancer Father     Alcohol abuse  Father     Cancer Father     Diabetes Father     Heart attack Father     Hypertension Father     Arthritis Father     Hearing loss Father     Heart disease Father     Hyperlipidemia Father     Breast cancer Sister 59    Cancer Sister     Hypertension Sister     Alcohol abuse Brother     Cancer Brother     Hypertension Brother     COPD Brother     Heart disease Brother     Hyperlipidemia Brother     Cancer Brother     Hypertension Brother     Early death Brother     Diabetes Maternal Grandmother     Stroke Maternal Grandmother     Ovarian cancer Paternal Grandmother 65    Cancer Paternal Grandmother     Alcohol abuse Maternal Grandfather     Breast cancer Cousin 55    Breast cancer Cousin 55    Breast cancer Cousin 58    Uterine cancer Neg Hx     Colon cancer Neg Hx     Melanoma Neg Hx        Social History     Socioeconomic History    Marital status:    Tobacco Use    Smoking status: Former     Packs/day: 2.00     Years: 20.00     Pack years: 40.00     Types: Cigarettes    Smokeless tobacco: Never   Substance and Sexual Activity    Alcohol use: Yes     Alcohol/week: 10.0 standard drinks     Types: 10 Glasses of wine per week     Comment: >3 times a week    Drug use: Never    Sexual activity: Yes     Partners: Male     Birth control/protection: Pill, Post-menopausal, Tubal ligation           Objective   Physical Exam  Vitals and nursing note reviewed.   Constitutional:       Appearance: Normal appearance. She is normal weight.      Comments: Generally weak appearing but in no distress   HENT:      Head: Normocephalic and atraumatic.      Nose: Nose normal. No congestion or rhinorrhea.      Mouth/Throat:      Mouth: Mucous membranes are moist.   Eyes:      Extraocular Movements: Extraocular movements intact.      Conjunctiva/sclera: Conjunctivae normal.      Pupils: Pupils are equal, round, and reactive to light.   Cardiovascular:      Rate and Rhythm: Normal rate and regular rhythm.      Heart sounds: No  murmur heard.  Pulmonary:      Effort: Pulmonary effort is normal.      Breath sounds: Normal breath sounds. No wheezing, rhonchi or rales.   Abdominal:      General: Abdomen is flat. Bowel sounds are normal.      Palpations: Abdomen is soft.   Musculoskeletal:         General: Normal range of motion.   Skin:     General: Skin is warm.      Capillary Refill: Capillary refill takes less than 2 seconds.   Neurological:      General: No focal deficit present.      Mental Status: She is alert and oriented to person, place, and time. Mental status is at baseline.       Procedures         Lab Results (last 24 hours)       Procedure Component Value Units Date/Time    SARS-CoV-2 PCR (COR,FLORENTINO,PAD IN-HOUSE)(OR EMERGENT/ADD-ON) [[id number]]  (Normal) Collected: 08/14/23 0150    Specimen: Swab from Nasopharynx Updated: 08/14/23 1435     COVID19 Not Detected    Narrative:      Fact sheet for providers: https://www.fda.gov/media/499288/download     Fact sheet for patients: https://www.fda.gov/media/239524/download  Fact sheet for providers: https://www.fda.gov/media/496643/download    Fact sheet for patients: https://www.fda.gov/media/741429/download    Test performed by PCR.    Comprehensive metabolic panel [[id number]]  (Abnormal) Collected: 08/14/23 1416    Specimen: Blood Updated: 08/14/23 1510     Glucose 122 mg/dL      BUN 16 mg/dL      Creatinine 1.68 mg/dL      Sodium 135 mmol/L      Potassium 4.2 mmol/L      Chloride 97 mmol/L      CO2 24.0 mmol/L      Calcium 9.2 mg/dL      Total Protein 6.7 g/dL      Albumin 4.3 g/dL      ALT (SGPT) 64 U/L      AST (SGOT) 152 U/L      Alkaline Phosphatase 135 U/L      Total Bilirubin 0.5 mg/dL      Globulin 2.4 gm/dL      A/G Ratio 1.8 g/dL      BUN/Creatinine Ratio 9.5     Anion Gap 14.0 mmol/L      eGFR 33.6 mL/min/1.73     Narrative:      GFR Normal >60  Chronic Kidney Disease <60  Kidney Failure <15      CBC (No Diff) [[id number]]  (Abnormal) Collected: 08/14/23 1416    Specimen:  Blood Updated: 08/14/23 1459     WBC 1.31 10*3/mm3      RBC 4.09 10*6/mm3      Hemoglobin 12.2 g/dL      Hematocrit 38.8 %      MCV 94.9 fL      MCH 29.8 pg      MCHC 31.4 g/dL      RDW 13.6 %      RDW-SD 47.8 fl      MPV 11.0 fL      Platelets 105 10*3/mm3     Tick Panel [[id number]] Collected: 08/14/23 1416    Specimen: Blood Updated: 08/14/23 1444    Narrative:      The following orders were created for panel order Tick Panel.  Procedure                               Abnormality         Status                     ---------                               -----------         ------                     Ehrlichia Profile DNA PCR[[id number]]                        In process                 Rickettsia Species DNA, ...[[id number]]                      In process                 Lyme Disease Total Antib...[[id number]]                      In process                   Please view results for these tests on the individual orders.    Ehrlichia Profile DNA PCR [[id number]] Collected: 08/14/23 1416    Specimen: Blood Updated: 08/14/23 1445    Rickettsia Species DNA, Real-Time PCR [[id number]] Collected: 08/14/23 1416    Specimen: Blood Updated: 08/14/23 1445    Lyme Disease Total Antibody With Reflex to Immunoassay [[id number]] Collected: 08/14/23 1416    Specimen: Blood Updated: 08/14/23 1444    Urinalysis With Culture If Indicated - Urine, Catheter [[id number]]  (Abnormal) Collected: 08/14/23 1652    Specimen: Urine, Catheter Updated: 08/14/23 3331     Color, UA Dark Yellow     Appearance, UA Cloudy     pH, UA 5.5     Specific Gravity, UA 1.020     Glucose, UA Negative     Ketones, UA Trace     Bilirubin, UA Small (1+)     Blood, UA Negative     Protein, UA 30 mg/dL (1+)     Leuk Esterase, UA Small (1+)     Nitrite, UA Negative     Urobilinogen, UA 1.0 E.U./dL    Narrative:      In absence of clinical symptoms, the presence of pyuria, bacteria, and/or nitrites on the urinalysis result does not correlate with infection.     Urinalysis, Microscopic Only - Urine, Catheter [[id number]]  (Abnormal) Collected: 08/14/23 1652    Specimen: Urine, Catheter Updated: 08/14/23 1734     RBC, UA None Seen /HPF      WBC, UA 3-5 /HPF      Comment: Urine culture not indicated.        Bacteria, UA Trace /HPF      Squamous Epithelial Cells, UA 0-2 /HPF      Transitional Epithelial Cells, UA 7-12 /HPF      Renal Epithelial Cells, UA 0-2 /HPF      Hyaline Casts, UA None Seen /LPF      Methodology Manual Light Microscopy    Blood Gas, Arterial - [[id number]]  (Abnormal) Collected: 08/14/23 1657    Specimen: Arterial Blood Updated: 08/14/23 1658     Site Right Brachial     To's Test N/A     pH, Arterial 7.392 pH units      pCO2, Arterial 35.4 mm Hg      pO2, Arterial 87.0 mm Hg      HCO3, Arterial 21.5 mmol/L      Base Excess, Arterial -2.9 mmol/L      Comment: 84 Value below reference range        O2 Saturation, Arterial 97.6 %      Temperature 37.0 C      Barometric Pressure for Blood Gas 747 mmHg      Modality Room Air     Ventilator Mode NA     Collected by [id number]     Comment: Meter: [id number]     :  [id number]        pCO2, Temperature Corrected 35.4 mm Hg      pH, Temp Corrected 7.392 pH Units      pO2, Temperature Corrected 87.0 mm Hg     Calcium, Ionized [[id number]] Collected: 08/14/23 1658    Specimen: Blood Updated: 08/14/23 1659     Ionized Calcium 4.68 mg/dL      Collected by [id number]     Comment: Meter: [id number]     :  [id number]       CBC & Differential [[id number]]  (Abnormal) Collected: 08/14/23 1659    Specimen: Blood Updated: 08/14/23 1727    Narrative:      The following orders were created for panel order CBC & Differential.  Procedure                               Abnormality         Status                     ---------                               -----------         ------                     CBC Auto Differential[[id number]]        Abnormal            Final result                 Please view results for these  tests on the individual orders.    Comprehensive Metabolic Panel [[id number]]  (Abnormal) Collected: 08/14/23 1659    Specimen: Blood Updated: 08/14/23 1736     Glucose 122 mg/dL      BUN 19 mg/dL      Creatinine 1.79 mg/dL      Sodium 133 mmol/L      Potassium 4.2 mmol/L      Chloride 95 mmol/L      CO2 24.0 mmol/L      Calcium 8.9 mg/dL      Total Protein 6.9 g/dL      Albumin 4.4 g/dL      ALT (SGPT) 74 U/L      AST (SGOT) 180 U/L      Alkaline Phosphatase 163 U/L      Total Bilirubin 0.5 mg/dL      Globulin 2.5 gm/dL      A/G Ratio 1.8 g/dL      BUN/Creatinine Ratio 10.6     Anion Gap 14.0 mmol/L      eGFR 31.2 mL/min/1.73     Narrative:      GFR Normal >60  Chronic Kidney Disease <60  Kidney Failure <15      Protime-INR [[id number]]  (Normal) Collected: 08/14/23 1659    Specimen: Blood Updated: 08/14/23 1729     Protime 13.1 Seconds      INR 0.98    aPTT [[id number]]  (Normal) Collected: 08/14/23 1659    Specimen: Blood Updated: 08/14/23 1729     PTT 25.2 seconds     Magnesium [[id number]]  (Normal) Collected: 08/14/23 1659    Specimen: Blood Updated: 08/14/23 1736     Magnesium 1.6 mg/dL     Phosphorus [[id number]]  (Normal) Collected: 08/14/23 1659    Specimen: Blood Updated: 08/14/23 1733     Phosphorus 4.3 mg/dL     Lactic Acid, Plasma [[id number]]  (Normal) Collected: 08/14/23 1659    Specimen: Blood Updated: 08/14/23 1732     Lactate 1.6 mmol/L     C-reactive Protein [[id number]]  (Abnormal) Collected: 08/14/23 1659    Specimen: Blood Updated: 08/14/23 1736     C-Reactive Protein 10.13 mg/dL     Blood Culture - Blood, Arm, Left [[id number]] Collected: 08/14/23 1659    Specimen: Blood from Arm, Left Updated: 08/14/23 1716    CBC Auto Differential [[id number]]  (Abnormal) Collected: 08/14/23 1659    Specimen: Blood Updated: 08/14/23 9653     WBC 1.39 10*3/mm3      RBC 4.09 10*6/mm3      Hemoglobin 12.3 g/dL      Hematocrit 38.9 %      MCV 95.1 fL      MCH 30.1 pg      MCHC 31.6 g/dL      RDW 13.6 %      RDW-SD 47.8  "fl      MPV 11.0 fL      Platelets 100 10*3/mm3     Manual Differential [[id number]]  (Abnormal) Collected: 08/14/23 1659    Specimen: Blood Updated: 08/14/23 1747     Neutrophil % 21.8 %      Lymphocyte % 18.8 %      Monocyte % 1.0 %      Basophil % 2.0 %      Bands %  53.5 %      Myelocyte % 1.0 %      Atypical Lymphocyte % 2.0 %      Neutrophils Absolute 1.05 10*3/mm3      Lymphocytes Absolute 0.29 10*3/mm3      Monocytes Absolute 0.01 10*3/mm3      Basophils Absolute 0.03 10*3/mm3      Anisocytosis Slight/1+     Microcytes Slight/1+     Vacuolated Neutrophils Slight/1+     Platelet Estimate Decreased     Giant Platelets Large/3+    Procalcitonin [[id number]]  (Abnormal) Collected: 08/14/23 1659    Specimen: Blood Updated: 08/14/23 1825     Procalcitonin 0.71 ng/mL     Narrative:      As a Marker for Sepsis (Non-Neonates):    1. <0.5 ng/mL represents a low risk of severe sepsis and/or septic shock.  2. >2 ng/mL represents a high risk of severe sepsis and/or septic shock.    As a Marker for Lower Respiratory Tract Infections that require antibiotic therapy:    PCT on Admission    Antibiotic Therapy       6-12 Hrs later    >0.5                Strongly Recommended  >0.25 - <0.5        Recommended   0.1 - 0.25          Discouraged              Remeasure/reassess PCT  <0.1                Strongly Discouraged     Remeasure/reassess PCT    As 28 day mortality risk marker: \"Change in Procalcitonin Result\" (>80% or <=80%) if Day 0 (or Day 1) and Day 4 values are available. Refer to http://www.[facility]-pct-calculator.com    Change in PCT <=80%  A decrease of PCT levels below or equal to 80% defines a positive change in PCT test result representing a higher risk for 28-day all-cause mortality of patients diagnosed with severe sepsis for septic shock.    Change in PCT >80%  A decrease of PCT levels of more than 80% defines a negative change in PCT result representing a lower risk for 28-day all-cause mortality of patients " diagnosed with severe sepsis or septic shock.       CK [[id number]]  (Normal) Collected: 08/14/23 1659    Specimen: Blood Updated: 08/14/23 1825     Creatine Kinase 84 U/L     Blood Culture - Blood, Arm, Right [[id number]] Collected: 08/14/23 1718    Specimen: Blood from Arm, Right Updated: 08/14/23 1728         XR Chest 1 View    Result Date: 8/14/2023  EXAMINATION: Chest 1 view 08/14/2023  HISTORY: Severe sepsis.  FINDINGS: Upright frontal projection of the chest demonstrates mild left basilar atelectasis. Lungs are otherwise clear. There is no effusion or free air present.      1.. Mild left basilar atelectasis. Lungs are otherwise clear. This report was finalized on 08/14/2023 18:03 by Dr. Phuc Welsh MD.    XR Abdomen KUB    Result Date: 8/14/2023  EXAM/TECHNIQUE: XR ABDOMEN KUB-  INDICATION: Abdominal pain and bloating  COMPARISON: None  FINDINGS:  Mild gaseous distention of the small bowel and colon. No findings to suggest bowel obstruction. No mass effect or suspicious calcifications. Mild degenerative change in the thoracolumbar spine. No acute osseous finding.       Mild gaseous distention of the small bowel and colon. No evidence of bowel obstruction. This report was finalized on 08/14/2023 15:25 by Dr. Ahmet Gaviria MD.    ED Course  ED Course as of 08/14/23 1825   Mon Aug 14, 2023   1817 63-year-old female presents to the ED at request of primary care physician with complaint of neutropenia.  Patient also have thrombocytopenia.  Patient has an absolute neutrophil count of 1000(including band cells).  Does have a bandemia, 53.5% bands.  Thrombocytopenia, platelets 100.  Lactate was normal.  Patient hypotensive on arrival to the ED, 81/55.  Has responded to IV fluids.  Lactate normal, procalcitonin pending.  She does have acute renal failure, creatinine 1.79, baseline 0.84.  No evidence of UTI.   Etiology for hypotension, bandemia, neutropenia, thrombocytopenia uncertain.  May be related to tick  bites, will send tick panel.  We will continue fluids, add vanc and doxy for concern for sepsis of uncertain etiology vs tick borne illness, admit to the hospital service. [AW]      ED Course User Index  [AW] Saad Newton MD                                           Medical Decision Making  Amount and/or Complexity of Data Reviewed  Labs: ordered.  Radiology: ordered.  ECG/medicine tests: ordered.    Risk  Prescription drug management.        Final diagnoses:   Acute renal failure, unspecified acute renal failure type   Neutropenia, unspecified type   Thrombocytopenia       ED Disposition  ED Disposition       ED Disposition   Decision to Admit    Condition   --    Comment   Level of Care: Remote Telemetry [26]   Diagnosis: Acute renal failure [465038]   Admitting Physician: MARIAM ENGLISH [[id number]]   Attending Physician: MARIAM ENGLISH [[id number]]   Certification: I Certify That Inpatient Hospital Services Are Medically Necessary For Greater Than 2 Midnights                 No follow-up provider specified.       Medication List      No changes were made to your prescriptions during this visit.            Saad Newton MD  08/14/23 1634

## 2023-08-15 PROBLEM — N83.209 OVARIAN CYST: Status: ACTIVE | Noted: 2023-08-15

## 2023-08-15 PROBLEM — R74.01 TRANSAMINITIS: Status: ACTIVE | Noted: 2023-08-15

## 2023-08-15 LAB
ALBUMIN SERPL-MCNC: 3.5 G/DL (ref 3.5–5.2)
ALBUMIN/GLOB SERPL: 1.5 G/DL
ALP SERPL-CCNC: 182 U/L (ref 39–117)
ALT SERPL W P-5'-P-CCNC: 76 U/L (ref 1–33)
ANION GAP SERPL CALCULATED.3IONS-SCNC: 15 MMOL/L (ref 5–15)
AST SERPL-CCNC: 189 U/L (ref 1–32)
B BURGDOR IGG+IGM SER QL IA: NEGATIVE
BASOPHILS # BLD MANUAL: 0.01 10*3/MM3 (ref 0–0.2)
BASOPHILS NFR BLD MANUAL: 1 % (ref 0–1.5)
BILIRUB SERPL-MCNC: 0.4 MG/DL (ref 0–1.2)
BUN SERPL-MCNC: 18 MG/DL (ref 8–23)
BUN/CREAT SERPL: 18.4 (ref 7–25)
CALCIUM SPEC-SCNC: 8.6 MG/DL (ref 8.6–10.5)
CHLORIDE SERPL-SCNC: 105 MMOL/L (ref 98–107)
CHOLEST SERPL-MCNC: 183 MG/DL (ref 0–200)
CO2 SERPL-SCNC: 17 MMOL/L (ref 22–29)
CREAT SERPL-MCNC: 0.98 MG/DL (ref 0.57–1)
CYTOLOGIST CVX/VAG CYTO: NORMAL
DEPRECATED RDW RBC AUTO: 44.4 FL (ref 37–54)
EGFRCR SERPLBLD CKD-EPI 2021: 64.2 ML/MIN/1.73
ERYTHROCYTE [DISTWIDTH] IN BLOOD BY AUTOMATED COUNT: 13.3 % (ref 12.3–15.4)
GIANT PLATELETS: ABNORMAL
GLOBULIN UR ELPH-MCNC: 2.3 GM/DL
GLUCOSE SERPL-MCNC: 151 MG/DL (ref 65–99)
HBA1C MFR BLD: 5.4 % (ref 4.8–5.6)
HCT VFR BLD AUTO: 34 % (ref 34–46.6)
HDLC SERPL-MCNC: 51 MG/DL (ref 40–60)
HGB BLD-MCNC: 11.2 G/DL (ref 12–15.9)
LDH SERPL-CCNC: 530 U/L (ref 135–214)
LDLC SERPL CALC-MCNC: 94 MG/DL (ref 0–100)
LDLC/HDLC SERPL: 1.71 {RATIO}
LYMPHOCYTES # BLD MANUAL: 0.36 10*3/MM3 (ref 0.7–3.1)
LYMPHOCYTES NFR BLD MANUAL: 7 % (ref 5–12)
MCH RBC QN AUTO: 30 PG (ref 26.6–33)
MCHC RBC AUTO-ENTMCNC: 32.9 G/DL (ref 31.5–35.7)
MCV RBC AUTO: 91.2 FL (ref 79–97)
MONOCYTES # BLD: 0.07 10*3/MM3 (ref 0.1–0.9)
NEUTROPHILS # BLD AUTO: 0.55 10*3/MM3 (ref 1.7–7)
NEUTROPHILS NFR BLD MANUAL: 24 % (ref 42.7–76)
NEUTS BAND NFR BLD MANUAL: 32 % (ref 0–5)
NRBC SPEC MANUAL: 1 /100 WBC (ref 0–0.2)
PATH INTERP BLD-IMP: NORMAL
PLATELET # BLD AUTO: 79 10*3/MM3 (ref 140–450)
PMV BLD AUTO: 12 FL (ref 6–12)
POTASSIUM SERPL-SCNC: 4.3 MMOL/L (ref 3.5–5.2)
PROT SERPL-MCNC: 5.8 G/DL (ref 6–8.5)
RBC # BLD AUTO: 3.73 10*6/MM3 (ref 3.77–5.28)
RBC MORPH BLD: NORMAL
SMALL PLATELETS BLD QL SMEAR: ABNORMAL
SODIUM SERPL-SCNC: 137 MMOL/L (ref 136–145)
TRIGL SERPL-MCNC: 225 MG/DL (ref 0–150)
TSH SERPL DL<=0.05 MIU/L-ACNC: 0.77 UIU/ML (ref 0.27–4.2)
VARIANT LYMPHS NFR BLD MANUAL: 10 % (ref 0–5)
VARIANT LYMPHS NFR BLD MANUAL: 26 % (ref 19.6–45.3)
VLDLC SERPL-MCNC: 38 MG/DL (ref 5–40)
WBC MORPH BLD: NORMAL
WBC NRBC COR # BLD: 0.99 10*3/MM3 (ref 3.4–10.8)

## 2023-08-15 PROCEDURE — 85060 BLOOD SMEAR INTERPRETATION: CPT | Performed by: INTERNAL MEDICINE

## 2023-08-15 PROCEDURE — 85025 COMPLETE CBC W/AUTO DIFF WBC: CPT | Performed by: FAMILY MEDICINE

## 2023-08-15 PROCEDURE — 83615 LACTATE (LD) (LDH) ENZYME: CPT | Performed by: INTERNAL MEDICINE

## 2023-08-15 PROCEDURE — 80053 COMPREHEN METABOLIC PANEL: CPT | Performed by: FAMILY MEDICINE

## 2023-08-15 PROCEDURE — 80061 LIPID PANEL: CPT | Performed by: FAMILY MEDICINE

## 2023-08-15 PROCEDURE — 84443 ASSAY THYROID STIM HORMONE: CPT | Performed by: FAMILY MEDICINE

## 2023-08-15 PROCEDURE — 85007 BL SMEAR W/DIFF WBC COUNT: CPT | Performed by: INTERNAL MEDICINE

## 2023-08-15 PROCEDURE — 25010000002 VANCOMYCIN 10 G RECONSTITUTED SOLUTION: Performed by: EMERGENCY MEDICINE

## 2023-08-15 RX ORDER — SIMETHICONE 80 MG
120 TABLET,CHEWABLE ORAL 4 TIMES DAILY PRN
Status: DISCONTINUED | OUTPATIENT
Start: 2023-08-15 | End: 2023-08-17 | Stop reason: HOSPADM

## 2023-08-15 RX ORDER — LISINOPRIL 10 MG/1
10 TABLET ORAL
Status: DISCONTINUED | OUTPATIENT
Start: 2023-08-15 | End: 2023-08-17 | Stop reason: HOSPADM

## 2023-08-15 RX ORDER — CARVEDILOL 6.25 MG/1
6.25 TABLET ORAL 2 TIMES DAILY WITH MEALS
Status: DISCONTINUED | OUTPATIENT
Start: 2023-08-15 | End: 2023-08-16

## 2023-08-15 RX ORDER — SIMETHICONE 20 MG/.3ML
20 EMULSION ORAL 4 TIMES DAILY PRN
Status: DISCONTINUED | OUTPATIENT
Start: 2023-08-15 | End: 2023-08-15

## 2023-08-15 RX ADMIN — LISINOPRIL 10 MG: 10 TABLET ORAL at 15:18

## 2023-08-15 RX ADMIN — DOCUSATE SODIUM 50 MG AND SENNOSIDES 8.6 MG 2 TABLET: 8.6; 5 TABLET, FILM COATED ORAL at 10:36

## 2023-08-15 RX ADMIN — ACETAMINOPHEN 650 MG: 325 TABLET, FILM COATED ORAL at 11:32

## 2023-08-15 RX ADMIN — Medication 10 ML: at 20:50

## 2023-08-15 RX ADMIN — ACETAMINOPHEN 650 MG: 325 TABLET, FILM COATED ORAL at 21:45

## 2023-08-15 RX ADMIN — FAMOTIDINE 20 MG: 10 INJECTION INTRAVENOUS at 20:42

## 2023-08-15 RX ADMIN — SIMETHICONE 120 MG: 80 TABLET, CHEWABLE ORAL at 23:27

## 2023-08-15 RX ADMIN — DOXYCYCLINE 100 MG: 100 INJECTION, POWDER, LYOPHILIZED, FOR SOLUTION INTRAVENOUS at 06:02

## 2023-08-15 RX ADMIN — CARVEDILOL 6.25 MG: 6.25 TABLET, FILM COATED ORAL at 19:23

## 2023-08-15 RX ADMIN — DOXYCYCLINE 100 MG: 100 INJECTION, POWDER, LYOPHILIZED, FOR SOLUTION INTRAVENOUS at 19:09

## 2023-08-15 RX ADMIN — Medication 10 ML: at 10:37

## 2023-08-15 RX ADMIN — VANCOMYCIN HYDROCHLORIDE 750 MG: 10 INJECTION, POWDER, LYOPHILIZED, FOR SOLUTION INTRAVENOUS at 07:21

## 2023-08-15 NOTE — PROGRESS NOTES
"    [facility] Medicine Services  INPATIENT PROGRESS NOTE    Patient Name: Melony Rincon  Date of Admission: 8/14/2023  Today's Date: 08/15/23  Length of Stay: 1  Primary Care Physician: ANDRÉS Louis MD    Subjective   Chief Complaint: Abdominal bloating  HPI   Patient does report 3 separate episodes of tick bites over the past few weeks.  [place] at least 3 ticks off of her, 1 of which she feels like was embedded for at least 2 days.  This particular tick was located near her left hip and reports having a large red welt around the site of tick bite.  She was seen by her primary care provider recently and it sounds like tick serologies were sent off.  She reports having a headache intermittently.  She reports having abdominal bloating as well.  Appetite has been marginal.  Overall has just not been feeling well and has had malaise and fatigue.  Reports that she has been taking Motrin \"about every 5 hours\" in the outpatient setting.    Review of Systems   All pertinent negatives and positives are as above. All other systems have been reviewed and are negative unless otherwise stated.     Objective    Temp:  [96.9 øF (36.1 øC)-98.2 øF (36.8 øC)] 98.2 øF (36.8 øC)  Heart Rate:  [52-87] 63  Resp:  [16-18] 16  BP: ()/(55-96) 136/85  Physical Exam  Vitals reviewed.   Constitutional:       General: She is not in acute distress.     Appearance: She is not toxic-appearing.   HENT:      Head: Normocephalic.      Mouth/Throat:      Mouth: Mucous membranes are moist.   Eyes:      Pupils: Pupils are equal, round, and reactive to light.   Pulmonary:      Effort: Pulmonary effort is normal. No respiratory distress.   Abdominal:      Palpations: Abdomen is soft.      Tenderness: There is no abdominal tenderness. There is no guarding.      Comments: Good bowel sounds throughout   Musculoskeletal:         General: No " swelling.   Skin:     General: Skin is warm.      Findings: No erythema or rash.   Neurological:      General: No focal deficit present.      Mental Status: She is alert.   Psychiatric:         Mood and Affect: Mood normal.       Results Review:  I have reviewed the labs, radiology results, and diagnostic studies.    Laboratory Data:   Results from last 7 days   Lab Units 08/15/23  0534 08/14/23  1659 08/14/23  1416   WBC 10*3/mm3 0.99* 1.39* 1.31*   HEMOGLOBIN g/dL 11.2* 12.3 12.2   HEMATOCRIT % 34.0 38.9 38.8   PLATELETS 10*3/mm3 79* 100* 105*        Results from last 7 days   Lab Units 08/15/23  0533 08/14/23  1659 08/14/23  1416   SODIUM mmol/L 137 133* 135*   POTASSIUM mmol/L 4.3 4.2 4.2   CHLORIDE mmol/L 105 95* 97*   CO2 mmol/L 17.0* 24.0 24.0   BUN mg/dL 18 19 16   CREATININE mg/dL 0.98 1.79* 1.68*   CALCIUM mg/dL 8.6 8.9 9.2   BILIRUBIN mg/dL 0.4 0.5 0.5   ALK PHOS U/L 182* 163* 135*   ALT (SGPT) U/L 76* 74* 64*   AST (SGOT) U/L 189* 180* 152*   GLUCOSE mg/dL 151* 122* 122*       Culture Data:   No results found for: BLOODCX, URINECX, WOUNDCX, MRSACX, RESPCX, STOOLCX    Radiology Data:   Imaging Results (Last 24 Hours)       Procedure Component Value Units Date/Time    CT Abdomen Pelvis Without Contrast [[id number]] Collected: 08/14/23 1915     Updated: 08/14/23 1928    Narrative:      CT ABDOMEN PELVIS WO CONTRAST- 8/14/2023 7:03 PM CDT     HISTORY: abd pain, bloating; N17.9-Acute kidney failure, unspecified;  D70.9-Neutropenia, unspecified; D69.6-Thrombocytopenia, unspecified      COMPARISON: None      DLP: 279 mGy cm. All CT scans are performed using dose optimization  techniques as appropriate to the performed exam and including at least  one of the following: Automated exposure control, adjustment of the mA  and/or kV according to size, and the use of the iterative reconstruction  technique.     TECHNIQUE: Noncontrast enhanced images of the abdomen and pelvis  obtained without oral contrast.       FINDINGS:   Bibasilar atelectasis is present. There is evidence of remote  granulomatous disease..      LIVER: No focal liver lesion.      BILIARY SYSTEM: The gallbladder is unremarkable. No intrahepatic or  extrahepatic ductal dilatation.      PANCREAS: No focal pancreatic lesion.      SPLEEN: Unremarkable.      KIDNEYS AND ADRENALS: A 1.4 cm hypodense right adrenal nodule is present  likely representing an adenoma. The adrenals are otherwise unremarkable.  No evidence of renal mass or nephrolithiasis. No perinephric fluid  collections are present..  The ureters are decompressed and normal in  appearance.     RETROPERITONEUM: No mass, lymphadenopathy or hemorrhage.      GI TRACT: Mild diverticulosis is noted in the sigmoid colon. No evidence  of diverticulitis. No evidence of mechanical bowel obstruction. No  gastric wall thickening or gastric outlet obstruction.. The appendix is  visualized and unremarkable.     OTHER: There is no mesenteric mass, lymphadenopathy or fluid collection.  The osseous structures and soft tissues demonstrate no worrisome  lesions. Mild grade 1 anterolisthesis of L4 on L5 likely represents  subluxation at the level of facets. There is degenerative disc disease  throughout the mid and lower thoracic spine as well as at L5-S1 with a  vacuum disc phenomena.      PELVIS: The uterus and adnexa are unremarkable for a small right ovarian  cyst measuring 1.4 cm in size. The patient has undergone previous tubal  ligation. No free fluid or adenopathy in the pelvis.. The urinary  bladder is normal in appearance.       Impression:      1. Mild diverticulosis of the sigmoid colon. No evidence of  diverticulitis or mechanical bowel obstruction.  2. No nephrolithiasis or obstructive uropathy. No mass or localized  inflammatory process is demonstrated.  3. Small right ovarian cyst measuring 1.4 cm in size. This would warrant  follow-up in a postmenopausal patient but has rather benign  radiographic  features. The patient is status post bilateral tubal ligation. No free  fluid in the cul-de-sac..         This report was finalized on 08/14/2023 19:24 by Dr. Phuc Welsh MD.    XR Chest 1 View [[id number]] Collected: 08/14/23 1802     Updated: 08/14/23 1806    Narrative:      EXAMINATION: Chest 1 view 08/14/2023     HISTORY: Severe sepsis.     FINDINGS: Upright frontal projection of the chest demonstrates mild left  basilar atelectasis. Lungs are otherwise clear. There is no effusion or  free air present.       Impression:      1.. Mild left basilar atelectasis. Lungs are otherwise clear.  This report was finalized on 08/14/2023 18:03 by Dr. Phuc Welsh MD.            I have reviewed the patient's current medications.     Assessment/Plan   Assessment  Active Hospital Problems    Diagnosis     **Acute renal failure     Ovarian cyst     Transaminitis     Hypotension     Tick bite     Bandemia     Thrombocytopenia     Neutropenia        Treatment Plan  Given recent tick bite(s) in conjunction with laboratory abnormalities including leukopenia, thrombocytopenia, transaminitis, agree with treatment with IV doxycycline  Patient reports that Dr. Louis (her PCP) recently sent off tick serologies; will need to follow-u with results.  Infectious disease was consulted yesterday  Will monitor off of IV vancomycin  IVFs with LR  6.   Plan to repeat CBC with diff in AM  7.   Check peripheral smear  8.   Repeat CMP in AM  9.   Hold rosuvastatin  10.  Continue to hold NSAIDs (patient has been taking Motrin frequently in outpatient setting)  11.  Results of CT A/P discussed with patient  12.  Hold outpatient anti-HTN meds; monitor BP trend     Medical Decision Making  Number and Complexity of problems: High complexity; worsening leukopenia, thrombocytopenia, and abnormal LFTs with transaminitis with known history of tick exposure and suspect tick related illness (Ehrlichia, [place] spotted fever).   Her acute kidney injury is improving after administration of intravenous fluids.  Blood pressure trend has also improved; patient hypotensive on arrival with blood pressure of 81/55.        Conditions and Status        Condition is unchanged.     MDM Data  External documents reviewed: none  Cardiac tracing (EKG, telemetry) interpretation: Normal sinus rhythm at 67 bpm with no acute ST-T changes  Radiology interpretation: CT of the abdomen and pelvis results reviewed with patient  Labs reviewed: as above  Any tests that were considered but not ordered: none at this time     Decision rules/scores evaluated (example [id number], [place], etc): none at this time     Discussed with: patient     Care Planning  Shared decision making: Discussed with patient with agreement to proceed with treatment plan as outlined  Code status and discussions: Full code    Disposition  Social Determinants of Health that impact treatment or disposition: None apparent at this time  I expect the patient to be discharged to home and 2-3 days    Electronically signed by Nilo Hardy MD, 08/15/23, 08:06 CDT.

## 2023-08-15 NOTE — PROGRESS NOTES
"Pharmacy Dosing Service  Pharmacokinetics  Vancomycin Initial Evaluation  Assessment/Action/Plan:  Loading dose?: Vancomycin 1250 mg IVPB once  Current Order: Vancomycin 750 mg IVPB every 24 hours  Current end date:08/21/2023  Levels: Obtain Vancomycin trough prior to dose on 8/17/2023  Additional antimicrobial agent(s): Doxycycline    Vancomycin dosage initiated based on population pharmacokinetic parameters. Pharmacy will continue to follow daily and adjust dose accordingly.     Subjective:  Melony Rincon is a 63 y.o. female with a Vancomycin \"Pharmacy to Dose\" consult for the treatment of Sepsis , day 1 of 7 of treatment.    AUC Model Data:  Loading dose: N/A  Regimen: 750 mg IV every 24 hours.  Start time: 07:00 on 08/15/2023  Exposure target: AUC24 (range) 400-600 mg/L.hr   AUC24,ss: 535 mg/L.hr  PAUC*: 71 %  Ctrough,ss: 16.8 mg/L  Pconc*: 40 %  Tox.: 12 %      Objective:  Ht: 160 cm (63\"); Wt: 68 kg (150 lb)  Estimated Creatinine Clearance: 29 mL/min (A) (by C-G formula based on SCr of 1.79 mg/dL (H)).   Creatinine   Date Value Ref Range Status   08/14/2023 1.79 (H) 0.57 - 1.00 mg/dL Final   08/14/2023 1.68 (H) 0.57 - 1.00 mg/dL Final   06/07/2023 0.84 0.57 - 1.00 mg/dL Final      Lab Results   Component Value Date    WBC 1.39 (C) 08/14/2023    WBC 1.31 (C) 08/14/2023    WBC 4.20 03/14/2023      Baseline culture results:  Microbiology Results (last 10 days)       Procedure Component Value - Date/Time    Respiratory Panel PCR w/COVID-19(SARS-CoV-2) MK/SUZETTE/FLORENTINO/PAD/COR/MAD/RADHA In-House, NP Swab in UTM/VTM, 3-4 HR TAT - Swab, Nasopharynx [[id number]]  (Normal) Collected: 08/14/23 1832    Lab Status: Final result Specimen: Swab from Nasopharynx Updated: 08/14/23 1951     ADENOVIRUS, PCR Not Detected     Coronavirus 229E Not Detected     Coronavirus HKU1 Not Detected     Coronavirus NL63 Not Detected     Coronavirus OC43 Not Detected     COVID19 Not Detected     Human Metapneumovirus Not Detected     Human " Rhinovirus/Enterovirus Not Detected     Influenza A PCR Not Detected     Influenza B PCR Not Detected     Parainfluenza Virus 1 Not Detected     Parainfluenza Virus 2 Not Detected     Parainfluenza Virus 3 Not Detected     Parainfluenza Virus 4 Not Detected     RSV, PCR Not Detected     Bordetella pertussis pcr Not Detected     Bordetella parapertussis PCR Not Detected     Chlamydophila pneumoniae PCR Not Detected     Mycoplasma pneumo by PCR Not Detected    Narrative:      In the setting of a positive respiratory panel with a viral infection PLUS a negative procalcitonin without other underlying concern for bacterial infection, consider observing off antibiotics or discontinuation of antibiotics and continue supportive care. If the respiratory panel is positive for atypical bacterial infection (Bordetella pertussis, Chlamydophila pneumoniae, or Mycoplasma pneumoniae), consider antibiotic de-escalation to target atypical bacterial infection.    SARS-CoV-2 PCR (COR,FLORENTINO,PAD IN-HOUSE)(OR EMERGENT/ADD-ON) [[id number]]  (Normal) Collected: 08/14/23 0150    Lab Status: Final result Specimen: Swab from Nasopharynx Updated: 08/14/23 1435     COVID19 Not Detected    Narrative:      Fact sheet for providers: https://www.fda.gov/media/557152/download     Fact sheet for patients: https://www.fda.gov/media/302520/download  Fact sheet for providers: https://www.fda.gov/media/219626/download    Fact sheet for patients: https://www.fda.gov/media/129455/download    Test performed by PCR.            Erika Monzon, PharmD  08/14/23 20:35 CDT

## 2023-08-15 NOTE — CONSULTS
INFECTIOUS DISEASES CONSULT NOTE    Patient:  Melony Rincon 63 y.o. female  ROOM # M203/1  YOB: 1960  MRN: [id number]  [facility]:  [id number]  Admit date: 8/14/2023   Admitting Physician: Nilo Hardy MD  Primary Care Physician: ANDRÉS Louis MD  REFERRING PROVIDER: Saad Newton, *    Reason for Consultation: Bandemia/history of tick bite/thrombocytopenia/neutropenia    History of Present Illness/Chief Complaint: Very pleasant 63-year-old woman.  She dates the onset of symptoms to around 9 or 10 days ago.  She first noticed a sense of fatigue and generalized weakness.  She indicates that she has some chronic intestinal issues.  She noticed with her onset of fatigue and weakness some increase in bloating, she felt like gas would move through the system, and she had some increased belching.  The symptoms were then followed by some worsening fatigue, some neck pain and some generalized headache.  She indicates she ached enough that she could not get comfortable.  She was taking ibuprofen periodically which offered some symptomatic relief but no resolution.  She then developed some soreness in her joints.  Saturday and Sunday she felt completely exhausted with the majority of the symptoms outlined above ongoing.  She presented to Hilario Louis her primary care physician on Monday.  Blood work was ordered.  She had evidence of leukopenia, thrombocytopenia, and hepatic transaminitis.  Hospitalization was recommended.  She was referred to the ER.  She was admitted last night.  So far she has had 2 doses of doxycycline.  She is also had a dose of methylprednisolone.  She is feeling somewhat better today in comparison to yesterday.  She indicates that she had had some chills and sweats at home but never documented a significant fever.  Sounds like she checked her temperature a few times but not frequently.  [place].  She has  removed 3 ticks over the past 1 to 2 months.  The most recent was from the left lateral thigh/hip area.  She indicates that tick was larger than the other 2 and she noticed a spot on the back suggesting a [place] tick.  Tick was removed about 2 weeks ago.  She does not have any animal or livestock exposure.  She has not had recent travel outside of this area.  No family members or close contacts have been ill.  Infectious diseases asked to evaluate and offer recommendations.    Current Scheduled Medications:   carvedilol, 6.25 mg, Oral, BID With Meals  doxycycline, 100 mg, Intravenous, Q12H  famotidine, 20 mg, Intravenous, Q24H  HYDROcodone-acetaminophen, 1 tablet, Oral, Once  lisinopril, 10 mg, Oral, Q24H  senna-docusate sodium, 2 tablet, Oral, BID  sodium chloride, 10 mL, Intravenous, Q12H      Current PRN Medications:    acetaminophen **OR** acetaminophen    senna-docusate sodium **AND** polyethylene glycol **AND** bisacodyl **AND** bisacodyl    ondansetron    sodium chloride    sodium chloride    sodium chloride    Allergies:    Allergies   Allergen Reactions    Codeine Shortness Of Breath    Adhesive Tape Itching     Redness and itching     Bactrim [Sulfamethoxazole-Trimethoprim] Other (See Comments)     BURN TONGUE    Statins Other (See Comments)     ELEVATED LIVER ENZYMES     Past Medical History: Hypertension.  Hypercholesterolemia.  She indicates a history of coronary artery disease that has not required any intervention.    Past Surgical History: She reports no prior surgeries.  She has had previous heart catheterization.    Social History: Remote history of tobacco use.  Quit 20 years ago.  She drinks 2 to 3 glasses of wine a day.  No illicit drug use.  She is .    Family History: Diabetes.  Stroke.  Heart disease.  Cancer.  She is not aware of any strong history of collagen vascular disease.    Exposure History: Tick exposure as outlined in the HPI.  No recent travel.  No animal exposure.  No  "livestock exposure.    Review of Systems she has not had any visual loss.  Her  drove her to the hospital and she indicated the images trees seemed somewhat vivid.  She has not had any jaw claudication.    Vital Signs:  /88 (BP Location: Right arm, Patient Position: Lying)   Pulse 71   Temp 98.7 øF (37.1 øC) (Temporal)   Resp 16   Ht 160 cm (63\")   Wt 69.6 kg (153 lb 7 oz)   LMP  (LMP Unknown)   SpO2 98%   BMI 27.18 kg/mý  Temp (24hrs), Av.2 øF (36.8 øC), Min:97.5 øF (36.4 øC), Max:98.7 øF (37.1 øC)    Physical Exam  Vital signs - reviewed.  Line/IV site - No erythema or tenderness.  Sclera not icteric  No conjunctival hemorrhages  General alert, pleasant, interactive, no distress  Smiling and interactive.  Sclera nonicteric  No conjunctival hemorrhages  No temporal area cord or tenderness  Neck is supple  Lungs clear without crackles  Heart regular rhythm without murmur  Abdomen soft and nontender without hepatosplenomegaly.  No mass.  Extremities no edema.  Neurological examination appear to be nonfocal.    Lab Results:  CBC:   Results from last 7 days   Lab Units 08/15/23  0534 23  1659 23  1416   WBC 10*3/mm3 0.99* 1.39* 1.31*   HEMOGLOBIN g/dL 11.2* 12.3 12.2   HEMATOCRIT % 34.0 38.9 38.8   PLATELETS 10*3/mm3 79* 100* 105*   LYMPHOCYTE % % 26.0 18.8*  --    MONOCYTES % % 7.0 1.0*  --      CMP:   Results from last 7 days   Lab Units 08/15/23  0533 23  1659 23  1416   SODIUM mmol/L 137 133* 135*   POTASSIUM mmol/L 4.3 4.2 4.2   CHLORIDE mmol/L 105 95* 97*   CO2 mmol/L 17.0* 24.0 24.0   BUN mg/dL 18 19 16   CREATININE mg/dL 0.98 1.79* 1.68*   CALCIUM mg/dL 8.6 8.9 9.2   BILIRUBIN mg/dL 0.4 0.5 0.5   ALK PHOS U/L 182* 163* 135*   ALT (SGPT) U/L 76* 74* 64*   AST (SGOT) U/L 189* 180* 152*   GLUCOSE mg/dL 151* 122* 122*     COVID-19 testing-negative  Urinalysis no red blood cells.  3-5 white blood cells per high-power field.    Rickettsia species PCR " testing-pending  Ehrlichia PCR-pending    Blood cultures no growth to date    C-reactive protein 10.1  CK 84      Radiology:     CT scan of the abdomen and pelvis done yesterday:  Films personally reviewed.  Agree with radiology interpretation.  IMPRESSION:  1. Mild diverticulosis of the sigmoid colon. No evidence of  diverticulitis or mechanical bowel obstruction.  2. No nephrolithiasis or obstructive uropathy. No mass or localized  inflammatory process is demonstrated.  3. Small right ovarian cyst measuring 1.4 cm in size. This would warrant  follow-up in a postmenopausal patient but has rather benign radiographic  features. The patient is status post bilateral tubal ligation. No free  fluid in the cul-de-sac..   This report was finalized on 08/14/2023 19:24 by Dr. Phuc Welsh MD.    Abdominal KUB done yesterday:  IMPRESSION:  Mild gaseous distention of the small bowel and colon. No evidence of  bowel obstruction.  This report was finalized on 08/14/2023 15:25 by Dr. Ahmet Gaviria MD.    Chest x-ray done yesterday personally reviewed:  Appears to be normal film.  No infiltrate, mass, or adenopathy.  IMPRESSION:  1.. Mild left basilar atelectasis. Lungs are otherwise clear.  This report was finalized on 08/14/2023 18:03 by Dr. Phuc Welsh MD.      Additional Studies Reviewed:     Peripheral blood smear:  Pathologist Interpretation Mild normochromic normocytic anemia    Marked leukocytopenia with the following 50 cell manual differential count:  Neutrophils 44%  Bands 18%  Lymphocytes 20%  Monocytes 12%  Atypical lymphocytes 6%  Nucleated red blood cells 2  Moderate peripheral thrombocytopenia    No schistocytes identified  No platelet clumps identified  No morulae seen in granulocytes or monocytes     Impression:   She has had a constellation of symptoms that include fatigue, myalgias/arthralgias, headache, leukopenia, thrombocytopenia, hepatic transaminitis, and outdoor/recent tick  exposure.  Feel her constellation of symptoms could fit with ehrlichiosis or less likely [place] spotted fever.  She seems to be responding to her initial treatment.  Hard to tell if she is responding to the doxycycline or the dose of methylprednisolone she received.  Would lean toward her responding therapeutically to doxycycline treatment.  Although she did not describe fevers or prominent symptom, she has had some chills and sweats.  Other possibilities would include viral etiologies such as cytomegalovirus, Yariel-Barr virus or West Nile virus.   Less likely possibilities that remain in the differential diagnosis would include a hematologic malignancy or an autoimmune process.  Collagen vascular/autoimmune disorders also in the differential.  At present leaning toward ehrlichiosis as the most likely explanation.    Recommendations:    Would continue empiric treatment with doxycycline  Await PCR testing to confirm a diagnosis of tickborne illness, but t those results may not be available for another 3 to 5 days.  Repeat lab in a.m. including CMP and CBC with differential.  We will add CMV serology, EBV serology, and West Nile serology.  Will continue to follow closely.    Sonu Naqvi MD  08/15/23  18:08 CDT

## 2023-08-15 NOTE — PLAN OF CARE
Goal Outcome Evaluation:  Plan of Care Reviewed With: patient        Progress: no change  Outcome Evaluation: BP is slightly elevated due to pt's home meds being on hold. Pt requested for RN to call MD and the MD gave new orders for BP medication. All other VSS. Pt is voiding without difficulty. Pt states she is easlily fatigued and c/o slight headache that is relieved with tylenol. IV ABX continued and LABS to be drawn in the AM.

## 2023-08-16 LAB
ALBUMIN SERPL-MCNC: 3.9 G/DL (ref 3.5–5.2)
ALBUMIN/GLOB SERPL: 2 G/DL
ALP SERPL-CCNC: 146 U/L (ref 39–117)
ALT SERPL W P-5'-P-CCNC: 60 U/L (ref 1–33)
ANION GAP SERPL CALCULATED.3IONS-SCNC: 11 MMOL/L (ref 5–15)
AST SERPL-CCNC: 87 U/L (ref 1–32)
B BURGDOR IGG+IGM SER QL IA: NEGATIVE
BASOPHILS # BLD MANUAL: 0.02 10*3/MM3 (ref 0–0.2)
BASOPHILS NFR BLD MANUAL: 1 % (ref 0–1.5)
BILIRUB SERPL-MCNC: 0.3 MG/DL (ref 0–1.2)
BUN SERPL-MCNC: 14 MG/DL (ref 8–23)
BUN/CREAT SERPL: 14.3 (ref 7–25)
CALCIUM SPEC-SCNC: 8.9 MG/DL (ref 8.6–10.5)
CHLORIDE SERPL-SCNC: 106 MMOL/L (ref 98–107)
CO2 SERPL-SCNC: 23 MMOL/L (ref 22–29)
CREAT SERPL-MCNC: 0.98 MG/DL (ref 0.57–1)
DEPRECATED RDW RBC AUTO: 46.3 FL (ref 37–54)
EGFRCR SERPLBLD CKD-EPI 2021: 64.2 ML/MIN/1.73
ERYTHROCYTE [DISTWIDTH] IN BLOOD BY AUTOMATED COUNT: 13.4 % (ref 12.3–15.4)
GIANT PLATELETS: ABNORMAL
GLOBULIN UR ELPH-MCNC: 2 GM/DL
GLUCOSE SERPL-MCNC: 104 MG/DL (ref 65–99)
HCT VFR BLD AUTO: 32.9 % (ref 34–46.6)
HGB BLD-MCNC: 10.3 G/DL (ref 12–15.9)
LYMPHOCYTES # BLD MANUAL: 0.46 10*3/MM3 (ref 0.7–3.1)
LYMPHOCYTES NFR BLD MANUAL: 5.1 % (ref 5–12)
MCH RBC QN AUTO: 29.3 PG (ref 26.6–33)
MCHC RBC AUTO-ENTMCNC: 31.3 G/DL (ref 31.5–35.7)
MCV RBC AUTO: 93.5 FL (ref 79–97)
MONOCYTES # BLD: 0.11 10*3/MM3 (ref 0.1–0.9)
NEUTROPHILS # BLD AUTO: 1.58 10*3/MM3 (ref 1.7–7)
NEUTROPHILS NFR BLD MANUAL: 55.6 % (ref 42.7–76)
NEUTS BAND NFR BLD MANUAL: 17.2 % (ref 0–5)
PLATELET # BLD AUTO: 103 10*3/MM3 (ref 140–450)
PMV BLD AUTO: 11 FL (ref 6–12)
POIKILOCYTOSIS BLD QL SMEAR: ABNORMAL
POTASSIUM SERPL-SCNC: 3.8 MMOL/L (ref 3.5–5.2)
PROT SERPL-MCNC: 5.9 G/DL (ref 6–8.5)
QT INTERVAL: 382 MS
QTC INTERVAL: 403 MS
RBC # BLD AUTO: 3.52 10*6/MM3 (ref 3.77–5.28)
SMALL PLATELETS BLD QL SMEAR: ABNORMAL
SODIUM SERPL-SCNC: 140 MMOL/L (ref 136–145)
VARIANT LYMPHS NFR BLD MANUAL: 21.2 % (ref 19.6–45.3)
WBC MORPH BLD: NORMAL
WBC NRBC COR # BLD: 2.17 10*3/MM3 (ref 3.4–10.8)

## 2023-08-16 PROCEDURE — 99232 SBSQ HOSP IP/OBS MODERATE 35: CPT | Performed by: INTERNAL MEDICINE

## 2023-08-16 PROCEDURE — 85025 COMPLETE CBC W/AUTO DIFF WBC: CPT | Performed by: INTERNAL MEDICINE

## 2023-08-16 PROCEDURE — 80053 COMPREHEN METABOLIC PANEL: CPT | Performed by: INTERNAL MEDICINE

## 2023-08-16 PROCEDURE — 85007 BL SMEAR W/DIFF WBC COUNT: CPT | Performed by: INTERNAL MEDICINE

## 2023-08-16 RX ORDER — CARVEDILOL 6.25 MG/1
12.5 TABLET ORAL 2 TIMES DAILY WITH MEALS
Status: DISCONTINUED | OUTPATIENT
Start: 2023-08-16 | End: 2023-08-17 | Stop reason: HOSPADM

## 2023-08-16 RX ORDER — FAMOTIDINE 20 MG/1
20 TABLET, FILM COATED ORAL NIGHTLY
Status: DISCONTINUED | OUTPATIENT
Start: 2023-08-16 | End: 2023-08-17 | Stop reason: HOSPADM

## 2023-08-16 RX ADMIN — FAMOTIDINE 20 MG: 20 TABLET, FILM COATED ORAL at 21:30

## 2023-08-16 RX ADMIN — SIMETHICONE 120 MG: 80 TABLET, CHEWABLE ORAL at 12:59

## 2023-08-16 RX ADMIN — ACETAMINOPHEN 650 MG: 325 TABLET, FILM COATED ORAL at 19:44

## 2023-08-16 RX ADMIN — SIMETHICONE 120 MG: 80 TABLET, CHEWABLE ORAL at 06:53

## 2023-08-16 RX ADMIN — CARVEDILOL 12.5 MG: 6.25 TABLET, FILM COATED ORAL at 17:57

## 2023-08-16 RX ADMIN — LISINOPRIL 10 MG: 10 TABLET ORAL at 09:12

## 2023-08-16 RX ADMIN — ACETAMINOPHEN 650 MG: 325 TABLET, FILM COATED ORAL at 04:26

## 2023-08-16 RX ADMIN — DOXYCYCLINE 100 MG: 100 INJECTION, POWDER, LYOPHILIZED, FOR SOLUTION INTRAVENOUS at 17:57

## 2023-08-16 RX ADMIN — SIMETHICONE 120 MG: 80 TABLET, CHEWABLE ORAL at 20:10

## 2023-08-16 RX ADMIN — Medication 10 ML: at 09:13

## 2023-08-16 RX ADMIN — Medication 2 TABLET: at 16:40

## 2023-08-16 RX ADMIN — ACETAMINOPHEN 650 MG: 325 TABLET, FILM COATED ORAL at 10:46

## 2023-08-16 RX ADMIN — DOXYCYCLINE 100 MG: 100 INJECTION, POWDER, LYOPHILIZED, FOR SOLUTION INTRAVENOUS at 06:53

## 2023-08-16 RX ADMIN — CARVEDILOL 12.5 MG: 6.25 TABLET, FILM COATED ORAL at 09:12

## 2023-08-16 NOTE — PLAN OF CARE
Goal Outcome Evaluation:  Plan of Care Reviewed With: patient           Outcome Evaluation: VSS; increased her coreg dose to 12.5mg today, pt still has complaints of headache/neck pain that RAYMOND arnold as ordered, continues to mylicon today, pt has alkaselzer available and took this afternoon, pt ambulating, pt voiding, IV antibiotics,

## 2023-08-16 NOTE — PROGRESS NOTES
Melonydella Rincon is a 63 y.o. female who qualifies for IV to PO therapy conversion.    Pepcid has been changed from IV to PO per System Policy.       EJ William [facility]

## 2023-08-16 NOTE — PROGRESS NOTES
[facility]  INPATIENT PROGRESS NOTE    Patient Name: Melony Rincon  Date of Admission: 8/14/2023  Today's Date: 08/16/23  Length of Stay: 2  Primary Care Physician: ANDRÉS Louis MD    Subjective   Chief Complaint: Follow-up  HPI   Patient on day 2 of hospitalization  First day of encounter with patient  Admitted for nausea vomiting, abdominal pain and weakness    Admitted with provisional diagnosis of:  Acute renal failure, abdominal pain, transaminitis, tach by history, bandemia, thrombocytopenia, neutropenia and reportedly hypotension    Prior to admission has had tick serologies sent off    Renal function improved  Vancomycin had been discontinued at least as of yesterday based on note reviewed  Dr. Moore had seen the patient in consultation  Crestor been on hold.  Likely related to transaminitis (AST as high as 189 while ALT at 76).    Summary of current laboratories in comparison with prior:  Leukopenia trough is 0.99.  Now coming back at 2.17 so is his platelet count.  Transaminase improving  Renal function back to normal    Patient's bloating improved.  Had taken Mylicon she said helped  Tolerated eggs today  Slowly improving overall  She cried during my visit.  She was concerned that she was dying and very anxious as she do not know anything about the disease process.  I informed her of the improving reassuring laboratory information.  Review of Systems   All pertinent negatives and positives are as above. All other systems have been reviewed and are negative unless otherwise stated.     Objective    Temp:  [97 øF (36.1 øC)-99.2 øF (37.3 øC)] 98.1 øF (36.7 øC)  Heart Rate:  [66-92] 78  Resp:  [16-19] 18  BP: (137-157)/(75-88) 137/88  Physical Exam  Patient seated at the edge of bed having breakfast  No distress until she had a meltdown.  She started crying and expressed her anxiety.    GEN: Awake, alert, interactive, in NAD  HEENT: Atraumatic,  PERRLA, EOMI, Anicteric, Trachea midline  Lungs: CTAB, no wheezing/rales/rhonchi  Heart: RRR, +S1/s2, no rub  ABD: soft, nt/nd, +BS, no guarding/rebound  Extremities: atraumatic, no cyanosis, no edema  Skin: no rashes or lesions  Neuro: AAOx3, no focal deficits  Psych: normal mood & affect        Results Review:  I have reviewed the labs, radiology results, and diagnostic studies.    Laboratory Data:   Results from last 7 days   Lab Units 08/16/23  0548 08/15/23  0534 08/14/23  1659   WBC 10*3/mm3 2.17* 0.99* 1.39*   HEMOGLOBIN g/dL 10.3* 11.2* 12.3   HEMATOCRIT % 32.9* 34.0 38.9   PLATELETS 10*3/mm3 103* 79* 100*        Results from last 7 days   Lab Units 08/16/23  0548 08/15/23  0533 08/14/23  1659   SODIUM mmol/L 140 137 133*   POTASSIUM mmol/L 3.8 4.3 4.2   CHLORIDE mmol/L 106 105 95*   CO2 mmol/L 23.0 17.0* 24.0   BUN mg/dL 14 18 19   CREATININE mg/dL 0.98 0.98 1.79*   CALCIUM mg/dL 8.9 8.6 8.9   BILIRUBIN mg/dL 0.3 0.4 0.5   ALK PHOS U/L 146* 182* 163*   ALT (SGPT) U/L 60* 76* 74*   AST (SGOT) U/L 87* 189* 180*   GLUCOSE mg/dL 104* 151* 122*       Culture Data:   Blood Culture   Date Value Ref Range Status   08/14/2023 No growth at 24 hours  Preliminary   08/14/2023 No growth at 24 hours  Preliminary       Radiology Data:   Imaging Results (Last 24 Hours)       ** No results found for the last 24 hours. **            I have reviewed the patient's current medications.     Assessment/Plan   Assessment  Active Hospital Problems    Diagnosis     **Acute renal failure     Ovarian cyst     Transaminitis     Hypotension     Tick bite     Bandemia     Thrombocytopenia     Neutropenia          Medical Decision Making  Number and Complexity of problems:   Suspected tickborne related illness (history of tick bite/exposure, leukopenia, thrombocytopenia, transaminitis, constitutional symptoms)-on doxycycline.  Appreciate input from Dr. Moore.  Other work-up such as CMV, EBV and West Nile serologies have mention by   Oscar but I did not see a physical order of this.  Will defer to him..  Other serologies from August 14 are still pending.  This includes Ehrlichia DNA, Rickettsia, Lyme disease.  Blood cultures no growth to date    Hypertension-blood pressure ranges in the 108 to 150.  NP Mickey increased carvedilol.  Heart rate been in the range of 66-92.  Monitor blood pressure  Hyperlipidemia-statin on hold    Treatment Plan    Patient improving slowly.  Labs improving.  If continues to improve Dr. Moore is agreeable, may consider discharge home in the next day or 2  Continue doxycycline  Labs pending  Monitor blood pressure    carvedilol, 12.5 mg, Oral, BID With Meals  doxycycline, 100 mg, Intravenous, Q12H  famotidine, 20 mg, Oral, Nightly  HYDROcodone-acetaminophen, 1 tablet, Oral, Once  lisinopril, 10 mg, Oral, Q24H  senna-docusate sodium, 2 tablet, Oral, BID  sodium chloride, 10 mL, Intravenous, Q12H        Conditions and Status  Fair     MDM Data  External documents reviewed: Reviewed  Cardiac tracing (EKG, telemetry) interpretation: EKG on admit showed sinus rhythm at 67, presence of Q waves in lead III, no acute ST-T wave changes.  Radiology interpretation: CT of the abdomen pelvis noted.  Mild diverticulosis of sigmoid colon.  No nephrolithiasis or obstructive uropathy.  Small ovarian cysts radiologist describes this as rather benign radiographic features.  Labs reviewed: Improving as shown above  Any tests that were considered but not ordered: None     Decision rules/scores evaluated (example [id number], [place], etc): None     Discussed with: Patient and nursing staff     Care Planning  Shared decision making: Patient, NP Mickey and consultant  Code status and discussions: Full code    Disposition  Social Determinants of Health that impact treatment or disposition: None identified at this time I expect the patient to be discharged to  :  If continues to improve Dr. Moore is agreeable, may consider discharge home in the  next day or 2    Electronically signed by Gabriele Otto MD, 08/16/23, 08:56 CDT.

## 2023-08-16 NOTE — PLAN OF CARE
Goal Outcome Evaluation:           Progress: improving  Outcome Evaluation: BP still elevated. New order for this am to increase Coreg to 12.5mg. Pt c/o headache/neck, and gas discomfort. Order received last night for Mylicon. Pt ambulating; Voiding WNL. IV antibiotics continue. Labs drawn this am.

## 2023-08-16 NOTE — PROGRESS NOTES
"INFECTIOUS DISEASES PROGRESS NOTE    Patient:  Melony Rincon  YOB: 1960  MRN: [id number]   Admit date: 2023   Admitting Physician: Gabriele Otto*  Primary Care Physician: ANDRÉS Louis MD    Chief Complaint: abdominal bloating        Interval History: The patient reports she had quite a bit of abdominal discomfort and bloating throughout the night.  Her primary complaint.  She states she has had GI issues for many years.  I asked her what she would take at home and she indicated baking soda and water or [place].        Allergies:   Allergies   Allergen Reactions    Codeine Shortness Of Breath    Adhesive Tape Itching     Redness and itching     Bactrim [Sulfamethoxazole-Trimethoprim] Other (See Comments)     BURN TONGUE    Statins Other (See Comments)     ELEVATED LIVER ENZYMES       Current Scheduled Medications:   carvedilol, 12.5 mg, Oral, BID With Meals  doxycycline, 100 mg, Intravenous, Q12H  famotidine, 20 mg, Oral, Nightly  HYDROcodone-acetaminophen, 1 tablet, Oral, Once  lisinopril, 10 mg, Oral, Q24H  senna-docusate sodium, 2 tablet, Oral, BID  sodium chloride, 10 mL, Intravenous, Q12H      Current PRN Medications:    acetaminophen **OR** acetaminophen    senna-docusate sodium **AND** polyethylene glycol **AND** bisacodyl **AND** bisacodyl    ondansetron    simethicone    sodium chloride    sodium chloride    sodium chloride      Objective     Vital Signs:  Temp (24hrs), Av.4 øF (36.9 øC), Min:97 øF (36.1 øC), Max:99.2 øF (37.3 øC)      /86 (BP Location: Right arm, Patient Position: Sitting)   Pulse 84   Temp 98.7 øF (37.1 øC) (Temporal)   Resp 18   Ht 160 cm (63\")   Wt 69.1 kg (152 lb 5.4 oz)   LMP  (LMP Unknown)   SpO2 94%   BMI 26.99 kg/mý         Physical Exam  General: The patient is nontoxic-appearing lying in bed in no acute distress  HEENT: Sclera anicteric and noninjected  Abdomen: Soft, no rebound or guarding.  Neuro: Alert and oriented, " speech clear    Results Review:    I reviewed the patient's new clinical results.    Lab Results:    CBC:   Lab Results   Lab 08/14/23  1416 08/14/23  1659 08/15/23  0534 08/16/23  0548   WBC 1.31* 1.39* 0.99* 2.17*   HEMOGLOBIN 12.2 12.3 11.2* 10.3*   HEMATOCRIT 38.8 38.9 34.0 32.9*   PLATELETS 105* 100* 79* 103*        AutoDiff:   Lab Results   Lab 08/14/23  1659 08/15/23  0534 08/16/23  0548   NEUTROS ABS 1.05* 0.55* 1.58*   BASOS ABS 0.03 0.01 0.02        Manual Diff:    Lab Results   Lab 08/14/23  1659 08/15/23  0534 08/16/23  0548   NEUTROPHIL % 21.8* 24.0* 55.6   LYMPHOCYTE % 18.8* 26.0 21.2   MONOCYTES % 1.0* 7.0 5.1   BANDS % 53.5* 32.0* 17.2*   NEUTROS ABS 1.05* 0.55* 1.58*   LYMPHS ABS 0.29* 0.36* 0.46*   ANISOCYTOSIS Slight/1+  --   --    MICROCYTES Slight/1+  --   --    POIKILOCYTES  --   --  Slight/1+   WBC MORPHOLOGY  --  Normal Normal           CMP:   Lab Results   Lab 08/14/23  1659 08/15/23  0533 08/16/23  0548   SODIUM 133* 137 140   POTASSIUM 4.2 4.3 3.8   CHLORIDE 95* 105 106   CO2 24.0 17.0* 23.0   BUN 19 18 14   CREATININE 1.79* 0.98 0.98   CALCIUM 8.9 8.6 8.9   BILIRUBIN 0.5 0.4 0.3   ALK PHOS 163* 182* 146*   ALT (SGPT) 74* 76* 60*   AST (SGOT) 180* 189* 87*   GLUCOSE 122* 151* 104*       Estimated Creatinine Clearance: 53.4 mL/min (by C-G formula based on SCr of 0.98 mg/dL).    Culture Results:    Microbiology Results (last 10 days)       Procedure Component Value - Date/Time    Respiratory Panel PCR w/COVID-19(SARS-CoV-2) MK/SUZETTE/FLORENTINO/PAD/COR/[facility] In-House, NP Swab in UTM/VTM, 3-4 HR TAT - Swab, Nasopharynx [[id number]]  (Normal) Collected: 08/14/23 1832    Lab Status: Final result Specimen: Swab from Nasopharynx Updated: 08/14/23 1951     ADENOVIRUS, PCR Not Detected     Coronavirus 229E Not Detected     Coronavirus HKU1 Not Detected     Coronavirus NL63 Not Detected     Coronavirus OC43 Not Detected     COVID19 Not Detected     Human Metapneumovirus Not Detected     Human  Rhinovirus/Enterovirus Not Detected     Influenza A PCR Not Detected     Influenza B PCR Not Detected     Parainfluenza Virus 1 Not Detected     Parainfluenza Virus 2 Not Detected     Parainfluenza Virus 3 Not Detected     Parainfluenza Virus 4 Not Detected     RSV, PCR Not Detected     Bordetella pertussis pcr Not Detected     Bordetella parapertussis PCR Not Detected     Chlamydophila pneumoniae PCR Not Detected     Mycoplasma pneumo by PCR Not Detected    Narrative:      In the setting of a positive respiratory panel with a viral infection PLUS a negative procalcitonin without other underlying concern for bacterial infection, consider observing off antibiotics or discontinuation of antibiotics and continue supportive care. If the respiratory panel is positive for atypical bacterial infection (Bordetella pertussis, Chlamydophila pneumoniae, or Mycoplasma pneumoniae), consider antibiotic de-escalation to target atypical bacterial infection.    Blood Culture - Blood, Arm, Right [[id number]]  (Normal) Collected: 08/14/23 1718    Lab Status: Preliminary result Specimen: Blood from Arm, Right Updated: 08/15/23 1731     Blood Culture No growth at 24 hours    Blood Culture - Blood, Arm, Left [[id number]]  (Normal) Collected: 08/14/23 1659    Lab Status: Preliminary result Specimen: Blood from Arm, Left Updated: 08/15/23 1731     Blood Culture No growth at 24 hours    SARS-CoV-2 PCR (COR,FLORENTINO,PAD IN-HOUSE)(OR EMERGENT/ADD-ON) [[id number]]  (Normal) Collected: 08/14/23 0150    Lab Status: Final result Specimen: Swab from Nasopharynx Updated: 08/14/23 1435     COVID19 Not Detected    Narrative:      Fact sheet for providers: https://www.fda.gov/media/922999/download     Fact sheet for patients: https://www.fda.gov/media/912493/download  Fact sheet for providers: https://www.fda.gov/media/315441/download    Fact sheet for patients: https://www.fda.gov/media/563406/download    Test performed by PCR.                 Radiology:    Imaging Results (Last 72 Hours)       Procedure Component Value Units Date/Time    CT Abdomen Pelvis Without Contrast [[id number]] Collected: 08/14/23 1915     Updated: 08/14/23 1928    Narrative:      CT ABDOMEN PELVIS WO CONTRAST- 8/14/2023 7:03 PM CDT     HISTORY: abd pain, bloating; N17.9-Acute kidney failure, unspecified;  D70.9-Neutropenia, unspecified; D69.6-Thrombocytopenia, unspecified      COMPARISON: None      DLP: 279 mGy cm. All CT scans are performed using dose optimization  techniques as appropriate to the performed exam and including at least  one of the following: Automated exposure control, adjustment of the mA  and/or kV according to size, and the use of the iterative reconstruction  technique.     TECHNIQUE: Noncontrast enhanced images of the abdomen and pelvis  obtained without oral contrast.      FINDINGS:   Bibasilar atelectasis is present. There is evidence of remote  granulomatous disease..      LIVER: No focal liver lesion.      BILIARY SYSTEM: The gallbladder is unremarkable. No intrahepatic or  extrahepatic ductal dilatation.      PANCREAS: No focal pancreatic lesion.      SPLEEN: Unremarkable.      KIDNEYS AND ADRENALS: A 1.4 cm hypodense right adrenal nodule is present  likely representing an adenoma. The adrenals are otherwise unremarkable.  No evidence of renal mass or nephrolithiasis. No perinephric fluid  collections are present..  The ureters are decompressed and normal in  appearance.     RETROPERITONEUM: No mass, lymphadenopathy or hemorrhage.      GI TRACT: Mild diverticulosis is noted in the sigmoid colon. No evidence  of diverticulitis. No evidence of mechanical bowel obstruction. No  gastric wall thickening or gastric outlet obstruction.. The appendix is  visualized and unremarkable.     OTHER: There is no mesenteric mass, lymphadenopathy or fluid collection.  The osseous structures and soft tissues demonstrate no worrisome  lesions. Mild grade 1 anterolisthesis of L4 on L5  likely represents  subluxation at the level of facets. There is degenerative disc disease  throughout the mid and lower thoracic spine as well as at L5-S1 with a  vacuum disc phenomena.      PELVIS: The uterus and adnexa are unremarkable for a small right ovarian  cyst measuring 1.4 cm in size. The patient has undergone previous tubal  ligation. No free fluid or adenopathy in the pelvis.. The urinary  bladder is normal in appearance.       Impression:      1. Mild diverticulosis of the sigmoid colon. No evidence of  diverticulitis or mechanical bowel obstruction.  2. No nephrolithiasis or obstructive uropathy. No mass or localized  inflammatory process is demonstrated.  3. Small right ovarian cyst measuring 1.4 cm in size. This would warrant  follow-up in a postmenopausal patient but has rather benign radiographic  features. The patient is status post bilateral tubal ligation. No free  fluid in the cul-de-sac..         This report was finalized on 08/14/2023 19:24 by Dr. Phuc Welsh MD.    XR Chest 1 View [[id number]] Collected: 08/14/23 1802     Updated: 08/14/23 1806    Narrative:      EXAMINATION: Chest 1 view 08/14/2023     HISTORY: Severe sepsis.     FINDINGS: Upright frontal projection of the chest demonstrates mild left  basilar atelectasis. Lungs are otherwise clear. There is no effusion or  free air present.       Impression:      1.. Mild left basilar atelectasis. Lungs are otherwise clear.  This report was finalized on 08/14/2023 18:03 by Dr. Phuc Welsh MD.            Assessment & Plan     Active Hospital Problems    Diagnosis     **Acute renal failure     Ovarian cyst     Transaminitis     Hypotension     Tick bite     Bandemia     Thrombocytopenia     Neutropenia        IMPRESSION:  63-year-old female who reports she was admitted with fatigue, myalgias, headache, GI upset and found to have pi¤a, thrombocytopenia and elevated transaminases.  Concern for tick related illness, most likely  Ehrlichia given tick exposure as well.-Patient overall improving however still complaining of GI disturbance    Leukopenia and bandemia - improving    Thrombocytopenia-improving    Elevated transaminases-improving      RECOMMENDATION:   Continue doxycycline for presumed tick related illness  Continue to monitor CBC and liver enzymes.  Hold off on adding CMV, EBV or West Nile virus since patient seems to be improving on doxycycline  Ordered Caron at patient request in place nursing order to let patient know it is available to her to ask for as it is as needed.  We will follow-up on Ehrlichia and [place]ell spotted fever PCR-it appears this has been ordered twice.  Will order cbc and cmp in am      Neelima Tee MD  08/16/23  14:49 CDT

## 2023-08-17 ENCOUNTER — READMISSION MANAGEMENT (OUTPATIENT)
Dept: CALL CENTER | Facility: HOSPITAL | Age: 63
End: 2023-08-17
Payer: MEDICARE

## 2023-08-17 ENCOUNTER — TELEPHONE (OUTPATIENT)
Dept: CARDIOLOGY | Facility: CLINIC | Age: 63
End: 2023-08-17
Payer: MEDICARE

## 2023-08-17 VITALS
RESPIRATION RATE: 18 BRPM | HEIGHT: 63 IN | HEART RATE: 77 BPM | WEIGHT: 154.6 LBS | TEMPERATURE: 96.6 F | OXYGEN SATURATION: 97 % | SYSTOLIC BLOOD PRESSURE: 155 MMHG | BODY MASS INDEX: 27.39 KG/M2 | DIASTOLIC BLOOD PRESSURE: 84 MMHG

## 2023-08-17 LAB
A PHAGOCYTOPH DNA BLD QL NAA+PROBE: NEGATIVE
ALBUMIN SERPL-MCNC: 3.4 G/DL (ref 3.5–5.2)
ALBUMIN/GLOB SERPL: 1.5 G/DL
ALP SERPL-CCNC: 130 U/L (ref 39–117)
ALT SERPL W P-5'-P-CCNC: 46 U/L (ref 1–33)
ANION GAP SERPL CALCULATED.3IONS-SCNC: 8 MMOL/L (ref 5–15)
AST SERPL-CCNC: 53 U/L (ref 1–32)
BILIRUB SERPL-MCNC: 0.3 MG/DL (ref 0–1.2)
BUN SERPL-MCNC: 12 MG/DL (ref 8–23)
BUN/CREAT SERPL: 15.6 (ref 7–25)
CALCIUM SPEC-SCNC: 8.7 MG/DL (ref 8.6–10.5)
CHLORIDE SERPL-SCNC: 108 MMOL/L (ref 98–107)
CO2 SERPL-SCNC: 27 MMOL/L (ref 22–29)
CREAT SERPL-MCNC: 0.77 MG/DL (ref 0.57–1)
DEPRECATED RDW RBC AUTO: 48.5 FL (ref 37–54)
E CHAFFEENSIS DNA BLD QL NAA+PROBE: POSITIVE
EGFRCR SERPLBLD CKD-EPI 2021: 85.7 ML/MIN/1.73
EOSINOPHIL # BLD MANUAL: 0.03 10*3/MM3 (ref 0–0.4)
EOSINOPHIL NFR BLD MANUAL: 1.1 % (ref 0.3–6.2)
ERYTHROCYTE [DISTWIDTH] IN BLOOD BY AUTOMATED COUNT: 13.8 % (ref 12.3–15.4)
GIANT PLATELETS: ABNORMAL
GLOBULIN UR ELPH-MCNC: 2.2 GM/DL
GLUCOSE SERPL-MCNC: 87 MG/DL (ref 65–99)
HCT VFR BLD AUTO: 31.9 % (ref 34–46.6)
HGB BLD-MCNC: 10.1 G/DL (ref 12–15.9)
LYMPHOCYTES # BLD MANUAL: 1.26 10*3/MM3 (ref 0.7–3.1)
LYMPHOCYTES NFR BLD MANUAL: 17.9 % (ref 5–12)
MCH RBC QN AUTO: 30.2 PG (ref 26.6–33)
MCHC RBC AUTO-ENTMCNC: 31.7 G/DL (ref 31.5–35.7)
MCV RBC AUTO: 95.5 FL (ref 79–97)
MONOCYTES # BLD: 0.54 10*3/MM3 (ref 0.1–0.9)
NEUTROPHILS # BLD AUTO: 1.16 10*3/MM3 (ref 1.7–7)
NEUTROPHILS NFR BLD MANUAL: 24.2 % (ref 42.7–76)
NEUTS BAND NFR BLD MANUAL: 14.7 % (ref 0–5)
PLATELET # BLD AUTO: 105 10*3/MM3 (ref 140–450)
PMV BLD AUTO: 11.3 FL (ref 6–12)
POTASSIUM SERPL-SCNC: 3.9 MMOL/L (ref 3.5–5.2)
PROT SERPL-MCNC: 5.6 G/DL (ref 6–8.5)
RBC # BLD AUTO: 3.34 10*6/MM3 (ref 3.77–5.28)
RBC MORPH BLD: NORMAL
RICKETTSIA RICKETTSII DNA, RT: NOT DETECTED
SMALL PLATELETS BLD QL SMEAR: ABNORMAL
SODIUM SERPL-SCNC: 143 MMOL/L (ref 136–145)
VARIANT LYMPHS NFR BLD MANUAL: 1.1 % (ref 0–5)
VARIANT LYMPHS NFR BLD MANUAL: 41.1 % (ref 19.6–45.3)
WBC MORPH BLD: NORMAL
WBC NRBC COR # BLD: 2.99 10*3/MM3 (ref 3.4–10.8)

## 2023-08-17 PROCEDURE — 80053 COMPREHEN METABOLIC PANEL: CPT | Performed by: INTERNAL MEDICINE

## 2023-08-17 PROCEDURE — 85025 COMPLETE CBC W/AUTO DIFF WBC: CPT | Performed by: INTERNAL MEDICINE

## 2023-08-17 PROCEDURE — 85007 BL SMEAR W/DIFF WBC COUNT: CPT | Performed by: INTERNAL MEDICINE

## 2023-08-17 RX ORDER — SIMETHICONE 80 MG
120 TABLET,CHEWABLE ORAL 4 TIMES DAILY PRN
Qty: 15 TABLET | Refills: 0 | Status: SHIPPED | OUTPATIENT
Start: 2023-08-17 | End: 2023-08-25

## 2023-08-17 RX ORDER — FAMOTIDINE 20 MG/1
20 TABLET, FILM COATED ORAL NIGHTLY
Qty: 5 TABLET | Refills: 0 | Status: SHIPPED | OUTPATIENT
Start: 2023-08-17 | End: 2023-08-22

## 2023-08-17 RX ORDER — DOXYCYCLINE HYCLATE 100 MG/1
100 CAPSULE ORAL 2 TIMES DAILY
Qty: 10 CAPSULE | Refills: 0 | Status: SHIPPED | OUTPATIENT
Start: 2023-08-17 | End: 2023-08-22

## 2023-08-17 RX ORDER — DOXYCYCLINE 100 MG/1
100 TABLET ORAL EVERY 12 HOURS SCHEDULED
Status: DISCONTINUED | OUTPATIENT
Start: 2023-08-17 | End: 2023-08-17 | Stop reason: HOSPADM

## 2023-08-17 RX ADMIN — CARVEDILOL 12.5 MG: 6.25 TABLET, FILM COATED ORAL at 08:15

## 2023-08-17 RX ADMIN — ACETAMINOPHEN 650 MG: 325 TABLET, FILM COATED ORAL at 01:57

## 2023-08-17 RX ADMIN — Medication 10 ML: at 08:18

## 2023-08-17 RX ADMIN — ACETAMINOPHEN 650 MG: 325 TABLET, FILM COATED ORAL at 08:15

## 2023-08-17 RX ADMIN — LISINOPRIL 10 MG: 10 TABLET ORAL at 08:15

## 2023-08-17 RX ADMIN — DOXYCYCLINE 100 MG: 100 TABLET, FILM COATED ORAL at 09:06

## 2023-08-17 NOTE — DISCHARGE SUMMARY
[facility] Medicine Services  DISCHARGE SUMMARY       Date of Admission: 8/14/2023  Date of Discharge:  8/17/2023  Primary Care Physician: ANDRÉS Louis MD    Presenting Problem/History of Present Illness:   Nausea vomiting/abdomen pain/weakness.     Final Discharge Diagnoses:  Suspected tickborne related illness (history of tick bite/exposure, leukopenia, thrombocytopenia, transaminitis, constitutional symptoms) - improving on doxycycline  CONNOR -probably from hypotension related to antihypertensive medication and or dehydration and or sepsis secondary to above  Systemic inflammatory response syndrome (bandemia/leukopenia plus hypotension)  History of HTN with hypotension on admission  HLD  Incidental finding of ovarian cyst on imaging study         Consults:   Dr. Moore/Dr. Mitchell    Procedures Performed:   none    Pertinent Test Results:       Imaging Results (All)       Procedure Component Value Units Date/Time    CT Abdomen Pelvis Without Contrast [[id number]] Collected: 08/14/23 1915     Updated: 08/14/23 1928    Narrative:      CT ABDOMEN PELVIS WO CONTRAST- 8/14/2023 7:03 PM CDT     HISTORY: abd pain, bloating; N17.9-Acute kidney failure, unspecified;  D70.9-Neutropenia, unspecified; D69.6-Thrombocytopenia, unspecified      COMPARISON: None      DLP: 279 mGy cm. All CT scans are performed using dose optimization  techniques as appropriate to the performed exam and including at least  one of the following: Automated exposure control, adjustment of the mA  and/or kV according to size, and the use of the iterative reconstruction  technique.     TECHNIQUE: Noncontrast enhanced images of the abdomen and pelvis  obtained without oral contrast.      FINDINGS:   Bibasilar atelectasis is present. There is evidence of remote  granulomatous disease..      LIVER: No focal liver lesion.      BILIARY SYSTEM: The gallbladder is unremarkable. No intrahepatic or  extrahepatic ductal  dilatation.      PANCREAS: No focal pancreatic lesion.      SPLEEN: Unremarkable.      KIDNEYS AND ADRENALS: A 1.4 cm hypodense right adrenal nodule is present  likely representing an adenoma. The adrenals are otherwise unremarkable.  No evidence of renal mass or nephrolithiasis. No perinephric fluid  collections are present..  The ureters are decompressed and normal in  appearance.     RETROPERITONEUM: No mass, lymphadenopathy or hemorrhage.      GI TRACT: Mild diverticulosis is noted in the sigmoid colon. No evidence  of diverticulitis. No evidence of mechanical bowel obstruction. No  gastric wall thickening or gastric outlet obstruction.. The appendix is  visualized and unremarkable.     OTHER: There is no mesenteric mass, lymphadenopathy or fluid collection.  The osseous structures and soft tissues demonstrate no worrisome  lesions. Mild grade 1 anterolisthesis of L4 on L5 likely represents  subluxation at the level of facets. There is degenerative disc disease  throughout the mid and lower thoracic spine as well as at L5-S1 with a  vacuum disc phenomena.      PELVIS: The uterus and adnexa are unremarkable for a small right ovarian  cyst measuring 1.4 cm in size. The patient has undergone previous tubal  ligation. No free fluid or adenopathy in the pelvis.. The urinary  bladder is normal in appearance.       Impression:      1. Mild diverticulosis of the sigmoid colon. No evidence of  diverticulitis or mechanical bowel obstruction.  2. No nephrolithiasis or obstructive uropathy. No mass or localized  inflammatory process is demonstrated.  3. Small right ovarian cyst measuring 1.4 cm in size. This would warrant  follow-up in a postmenopausal patient but has rather benign radiographic  features. The patient is status post bilateral tubal ligation. No free  fluid in the cul-de-sac..         This report was finalized on 08/14/2023 19:24 by Dr. Phuc Welsh MD.    XR Chest 1 View [[id number]] Collected: 08/14/23  1802     Updated: 08/14/23 1806    Narrative:      EXAMINATION: Chest 1 view 08/14/2023     HISTORY: Severe sepsis.     FINDINGS: Upright frontal projection of the chest demonstrates mild left  basilar atelectasis. Lungs are otherwise clear. There is no effusion or  free air present.       Impression:      1.. Mild left basilar atelectasis. Lungs are otherwise clear.  This report was finalized on 08/14/2023 18:03 by Dr. Phuc Welsh MD.          LAB RESULTS:      Lab 08/16/23  0548 08/15/23  0534 08/15/23  0533 08/14/23  1659 08/14/23  1416   WBC 2.17* 0.99*  --  1.39* 1.31*   HEMOGLOBIN 10.3* 11.2*  --  12.3 12.2   HEMATOCRIT 32.9* 34.0  --  38.9 38.8   PLATELETS 103* 79*  --  100* 105*   NEUTROS ABS 1.58* 0.55*  --  1.05*  --    MCV 93.5 91.2  --  95.1 94.9   CRP  --   --   --  10.13*  --    PROCALCITONIN  --   --   --  0.71*  --    LACTATE  --   --   --  1.6  --    LDH  --   --  530*  --   --    PROTIME  --   --   --  13.1  --    APTT  --   --   --  25.2  --          Lab 08/16/23  0548 08/15/23  0533 08/14/23  1659 08/14/23  1658 08/14/23  1416   SODIUM 140 137 133*  --  135*   POTASSIUM 3.8 4.3 4.2  --  4.2   CHLORIDE 106 105 95*  --  97*   CO2 23.0 17.0* 24.0  --  24.0   ANION GAP 11.0 15.0 14.0  --  14.0   BUN 14 18 19  --  16   CREATININE 0.98 0.98 1.79*  --  1.68*   EGFR 64.2 64.2 31.2*  --  33.6*   GLUCOSE 104* 151* 122*  --  122*   CALCIUM 8.9 8.6 8.9  --  9.2   IONIZED CALCIUM  --   --   --  4.68  --    MAGNESIUM  --   --  1.6  --   --    PHOSPHORUS  --   --  4.3  --   --    HEMOGLOBIN A1C  --   --  5.40  --   --    TSH  --  0.772  --   --   --          Lab 08/16/23  0548 08/15/23  0533 08/14/23  1659 08/14/23  1416   TOTAL PROTEIN 5.9* 5.8* 6.9 6.7   ALBUMIN 3.9 3.5 4.4 4.3   GLOBULIN 2.0 2.3 2.5 2.4   ALT (SGPT) 60* 76* 74* 64*   AST (SGOT) 87* 189* 180* 152*   BILIRUBIN 0.3 0.4 0.5 0.5   ALK PHOS 146* 182* 163* 135*         Lab 08/14/23 1659   PROTIME 13.1   INR 0.98         Lab 08/15/23  0533    CHOLESTEROL 183   LDL CHOL 94   HDL CHOL 51   TRIGLYCERIDES 225*             Lab 08/14/23  1657   PH, ARTERIAL 7.392   PCO2, ARTERIAL 35.4   PO2 ART 87.0   O2 SATURATION ART 97.6   HCO3 ART 21.5   BASE EXCESS ART -2.9*     Brief Urine Lab Results  (Last result in the past 365 days)        Color   Clarity   Blood   Leuk Est   Nitrite   Protein   CREAT   Urine HCG        08/14/23 1652 Dark Yellow   Cloudy   Negative   Small (1+)   Negative   30 mg/dL (1+)                 Microbiology Results (last 10 days)       Procedure Component Value - Date/Time    Respiratory Panel PCR w/COVID-19(SARS-CoV-2) MK/SUZETTE/FLORENTINO/PAD/COR/MAD/RADHA In-House, NP Swab in UTM/VTM, 3-4 HR TAT - Swab, Nasopharynx [[id number]]  (Normal) Collected: 08/14/23 1832    Lab Status: Final result Specimen: Swab from Nasopharynx Updated: 08/14/23 1951     ADENOVIRUS, PCR Not Detected     Coronavirus 229E Not Detected     Coronavirus HKU1 Not Detected     Coronavirus NL63 Not Detected     Coronavirus OC43 Not Detected     COVID19 Not Detected     Human Metapneumovirus Not Detected     Human Rhinovirus/Enterovirus Not Detected     Influenza A PCR Not Detected     Influenza B PCR Not Detected     Parainfluenza Virus 1 Not Detected     Parainfluenza Virus 2 Not Detected     Parainfluenza Virus 3 Not Detected     Parainfluenza Virus 4 Not Detected     RSV, PCR Not Detected     Bordetella pertussis pcr Not Detected     Bordetella parapertussis PCR Not Detected     Chlamydophila pneumoniae PCR Not Detected     Mycoplasma pneumo by PCR Not Detected    Narrative:      In the setting of a positive respiratory panel with a viral infection PLUS a negative procalcitonin without other underlying concern for bacterial infection, consider observing off antibiotics or discontinuation of antibiotics and continue supportive care. If the respiratory panel is positive for atypical bacterial infection (Bordetella pertussis, Chlamydophila pneumoniae, or Mycoplasma pneumoniae),  consider antibiotic de-escalation to target atypical bacterial infection.    Blood Culture - Blood, Arm, Right [[id number]]  (Normal) Collected: 08/14/23 1718    Lab Status: Preliminary result Specimen: Blood from Arm, Right Updated: 08/16/23 1731     Blood Culture No growth at 2 days    Blood Culture - Blood, Arm, Left [[id number]]  (Normal) Collected: 08/14/23 1659    Lab Status: Preliminary result Specimen: Blood from Arm, Left Updated: 08/16/23 1731     Blood Culture No growth at 2 days    SARS-CoV-2 PCR (COR,FLORNETINO,PAD IN-HOUSE)(OR EMERGENT/ADD-ON) [[id number]]  (Normal) Collected: 08/14/23 0150    Lab Status: Final result Specimen: Swab from Nasopharynx Updated: 08/14/23 1435     COVID19 Not Detected    Narrative:      Fact sheet for providers: https://www.fda.gov/media/812318/download     Fact sheet for patients: https://www.fda.gov/media/831128/download  Fact sheet for providers: https://www.fda.gov/media/326255/download    Fact sheet for patients: https://www.fda.gov/media/738516/download    Test performed by PCR.            Hospital Course:   Patient states that she slept well last night.  Bloating has significantly improved.  Body aches improved.  No fever or chills.  No nausea or vomiting and tolerating diet.  Overall she is clinically improving.    Discussed yesterday with Dr. Moore.  He recommends continuing doxycycline for 5 more days for suspected tick related disease.  Serology/PCR still pending.  I instructed the patient to avoid sun exposure while taking doxycycline.    Patient is a 63-year-old woman who presented with nausea vomiting, abdominal pain, generalized weakness, fatigue and lightheadedness.  Patient reportedly hypotensive as per admitting H&P.  Serum sodium is 133.  Her creatinine was elevated at 1.79.  CRP was elevated while procalcitonin was elevated at 0.71.  Laboratory finding also showed thrombocytopenia, bandemia, thrombocytopenia, elevated transaminase.    It appears to be that prior  "to admission she already had to panel done maybe by her primary care provider.  This has not been resulted yet at the time of discharge.  Patient was treated empirically with doxycycline and appears to be clinically improved being.  Laboratory findings are improving as well.  With hydration, her renal function improved.    Patient is believed to be medically stable and appropriate for discharge.  All questions were answered to the best of my abilities.    Note that since ALT and AST were elevated, statin was held off.  I will defer this to his primary care provider to resume based on liver function tests.    Patient also has history of hypertension.  As documented by Dr. Rodriguez on admission, patient was hypotensive (81/55).  In retrospect, patient may have been septic (leukopenia with bandemia, hypotension-probably combination of dehydration and antihypertensive medications) from suspected tick related disease probably Ehrlichiosis.     In terms of blood pressure medication, I resumed the home dose of carvedilol and held off on lisinopril for now.  Patient encouraged to monitor blood pressure and follow-up closely with primary care provider.    Physical Exam on Discharge:  /78 (BP Location: Right arm, Patient Position: Lying)   Pulse 71   Temp 96.5 øF (35.8 øC) (Oral)   Resp 18   Ht 160 cm (63\")   Wt 70.1 kg (154 lb 9.6 oz)   LMP  (LMP Unknown)   SpO2 98%   BMI 27.39 kg/mý   Physical Exam  Looks better.  Appears to have had her shower already this morning.  Stable gait  GEN: Awake, alert, interactive, in NAD  HEENT: Atraumatic, PERRLA, EOMI, Anicteric, Trachea midline  Lungs: Normal respiratory effort  Heart: RRR, +S1/s2,   ABD: soft, nt/nd,   Extremities: atraumatic, no cyanosis, no edema  Skin: no rashes or lesions  Neuro: AAOx3, no focal deficits  Psych: normal mood & affect        Condition on Discharge: Stable    Discharge Disposition:  Home or Self Care    Discharge Medications:     Discharge " Medications        New Medications        Instructions Start Date   doxycycline 100 MG capsule  Commonly known as: VIBRAMYCIN   100 mg, Oral, 2 Times Daily      famotidine 20 MG tablet  Commonly known as: PEPCID   20 mg, Oral, Nightly      simethicone 80 MG chewable tablet  Commonly known as: MYLICON   120 mg, Oral, 4 Times Daily PRN             Continue These Medications        Instructions Start Date   aspirin 81 MG EC tablet   81 mg, Oral, Daily      carvedilol 25 MG tablet  Commonly known as: COREG   25 mg, Oral, 2 Times Daily With Meals      fluticasone 50 MCG/ACT nasal spray  Commonly known as: Flonase   2 sprays, Nasal, Daily      ibuprofen 200 MG tablet  Commonly known as: ADVIL,MOTRIN   600 mg, Oral, As Needed      lansoprazole 30 MG capsule  Commonly known as: PREVACID   30 mg, Oral, Daily             Stop These Medications      lisinopril 10 MG tablet  Commonly known as: PRINIVIL,ZESTRIL     rosuvastatin 5 MG tablet  Commonly known as: CRESTOR                Discharge Diet:   Diet Instructions       Diet: Cardiac Diets; Healthy Heart (2-3 Na+); Regular Texture (IDDSI 7); Thin (IDDSI 0)      Discharge Diet: Cardiac Diets    Cardiac Diet: Healthy Heart (2-3 Na+)    Texture: Regular Texture (IDDSI 7)    Fluid Consistency: Thin (IDDSI 0)            Activity at Discharge:   Activity Instructions       Gradually Increase Activity Until at Pre-Hospitalization Level              Follow-up Appointments:   Future Appointments   Date Time Provider Department Center   9/19/2023 11:00 AM Marilyn Davies APRN [facility]   9/20/2023 10:30 AM Bailee Hightower APRN [facility]   3/8/2024  9:00 AM ANDRÉS Louis MD [facility]       Test Results Pending at Discharge: None    Electronically signed by Gabriele Otto MD, 08/17/23, 07:50 CDT.    Time: Greater than 30 minutes.

## 2023-08-17 NOTE — PLAN OF CARE
Goal Outcome Evaluation:           Progress: improving  Outcome Evaluation: vss. voiding and passing flauts. pain in head/neck only. controling pain with po med. tolerating iv antibx well. refuses SCD's. up ad meredith.

## 2023-08-17 NOTE — OUTREACH NOTE
Prep Survey      Flowsheet Row Responses   [facility] facility patient discharged from? [place]   Is LACE score < 7 ? Yes   Eligibility [facility]   Date of Admission 08/14/23   Date of Discharge 08/17/23   Discharge Disposition Home or Self Care   Discharge diagnosis Acute renal failureSuspected tickborne related illness   Does the patient have one of the following disease processes/diagnoses(primary or secondary)? Other   Does the patient have Home health ordered? No   Is there a DME ordered? No   Prep survey completed? Yes            BILLY CAVANAUGH - Registered Nurse

## 2023-08-17 NOTE — TELEPHONE ENCOUNTER
Left a detailed message as is allowed per  verbal release that patient's appointment for 9/19 is canceled, per Dr Mcgill and HARI Medina. Informed patient that they both agree that there is no need for another visit unless she is having significantly worsened chest pain or any new heart related symptoms. I also reassured her that she's in excellent hands with her PCP, Dr Louis for blood pressure management. Heather Franks MA

## 2023-08-18 ENCOUNTER — TRANSITIONAL CARE MANAGEMENT TELEPHONE ENCOUNTER (OUTPATIENT)
Dept: CALL CENTER | Facility: HOSPITAL | Age: 63
End: 2023-08-18
Payer: MEDICARE

## 2023-08-18 NOTE — PROGRESS NOTES
PATIENT HAS BEEN CALLED, SHE STATED THAT HER BP /95 THIS MORNING.   141/91  08/18/2023 AT 0830am  TOOK COREG AND AT 1030am IT /93 HR IS 75   154/100  08/17/2023  AT 5pm  150/100  08/17/2023  AT 530pm  158/98    08/17/2023  AT 6pm  162/100  08/17/2023  AT 630pm WENT AND LAID AROUND 7pm  IT CAME DOWN TO  144/95    08/17/2023  AT 8pm     163/102  08/17/2023  AT 830pm  TOOK LISINOPRIL.    THROUGH OUT THE NIGHT THE NUMBERS STAYED IN 'S/80'S AND HR HAS STAYED IN THE 70'S    SHE STATED THAT HER LIVER ENZYMES WAS ELEVATED AND HER CRESTOR HAS BEEN DISCONTINUED.  SHE IS ASKING IF AND WHEN SHE NEEDS TO START IT?

## 2023-08-18 NOTE — PROGRESS NOTES
PATIENT HAS BEEN CALLED, GIVEN MESSAGE AND WILL KEEP AN EYE ON IT.   SHE STATED THAT HER LISINOPRIL WAS NOT CONTINUED AT DISCHARGE.  PATIENT STATED THAT IT IS NOT CONTROLLED WITHOUT IT.  SHE HAS RESUMED TAKING IT.

## 2023-08-18 NOTE — OUTREACH NOTE
Call Center TCM Note      Flowsheet Row Responses   [facility] patient discharged from? [place]   Does the patient have one of the following disease processes/diagnoses(primary or secondary)? Other   TCM attempt successful? Yes   Call start time 0919   Call end time 0928   Discharge diagnosis Acute renal failureSuspected tickborne related illness   Meds reviewed with patient/caregiver? Yes   Is the patient having any side effects they believe may be caused by any medication additions or changes? Yes   Does the patient have all medications ordered at discharge? Yes   Is the patient taking all medications as directed (includes completed medication regime)? Yes   Medication comments Pt reports that Lisinopril DC, however last night BP went up to 164/103. Pt states she took the lisinopril and it came down to 140s/90s   Comments HOSP DC FU appt 8/24/23  815 am.   Does the patient have an appointment with their PCP within 7-14 days of discharge? Yes   Has home health visited the patient within 72 hours of discharge? N/A   Psychosocial issues? No   Did the patient receive a copy of their discharge instructions? Yes   Nursing interventions Reviewed instructions with patient   What is the patient's perception of their health status since discharge? Improving   Is the patient/caregiver able to teach back signs and symptoms related to disease process for when to call PCP? Yes   Is the patient/caregiver able to teach back signs and symptoms related to disease process for when to call 911? Yes   Is the patient/caregiver able to teach back the hierarchy of who to call/visit for symptoms/problems? PCP, Specialist, Home health nurse, Urgent Care, ED, 911 Yes   TCM call completed? Yes   Wrap up additional comments Pt has questions about why Lisinopril was dc as well as the crestor. Pt states BP is now elevated. She will discuss with PCP.   Call end time 0928            Lupe Lee RN    8/18/2023, 09:30 CDT

## 2023-08-18 NOTE — PROGRESS NOTES
During the hospitalization they held some of her blood pressure medications, so this is not surprising.  I would expect her blood pressure to return back to her usual range here soon.  We will discuss restarting her Crestor after we get repeat labs when she comes back to the office next week.  In the meantime continue keeping a close eye on her blood pressure.  She can bring a log with her next week to the office visit.

## 2023-08-19 LAB
BACTERIA SPEC AEROBE CULT: NORMAL
BACTERIA SPEC AEROBE CULT: NORMAL

## 2023-08-20 LAB
A PHAGOCYTOPH DNA BLD QL NAA+PROBE: NEGATIVE
E CHAFFEENSIS DNA BLD QL NAA+PROBE: POSITIVE
RICKETTSIA RICKETTSII DNA, RT: NOT DETECTED

## 2023-08-23 ENCOUNTER — TELEPHONE (OUTPATIENT)
Dept: INTERNAL MEDICINE | Facility: CLINIC | Age: 63
End: 2023-08-23

## 2023-08-23 DIAGNOSIS — R76.8 POSITIVE SEROLOGY FOR ERLICHIOSIS: Primary | ICD-10-CM

## 2023-08-23 NOTE — TELEPHONE ENCOUNTER
PT WOULD LIKE FOR HER LABS TO BE DONE TOMORROW.. SHE HAS AN APPOINTMENT ON FRIDAY. CAN WE PUT ORDERS IN FOR THAT? PLEASE CALL PT TO DISCUSS

## 2023-08-23 NOTE — PROGRESS NOTES
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  I saw her in consultation at [facility] 1 week ago for pancytopenia.  Had suspected ehrlichiosis.  Subsequent Ehrlichia PCR returned positive.  While she was in the hospital she had shown some initial clinical response to doxycycline.  She completed an additional 5 days of doxycycline postdischarge.  She has some very mild residual headache which is improving.  She has fatigue and poor energy level but that is also trending in the right direction.  She is not having any fevers.  She is not having the severe body aches she had previously.  She has had some long history of some GI related symptoms.  Does seem to have returned to baseline.  She had her laboratory work done in follow-up this morning.  I was able to show her the results on the computer in the exam room today.  No new localizing symptoms.    Allergies:   Allergies   Allergen Reactions    Codeine Shortness Of Breath    Adhesive Tape Itching     Redness and itching     Bactrim [Sulfamethoxazole-Trimethoprim] Other (See Comments)     BURN TONGUE    Statins Other (See Comments)     ELEVATED LIVER ENZYMES     Current Scheduled Medications:   Current Outpatient Medications on File Prior to Visit   Medication Sig    aspirin 81 MG EC tablet Take 1 tablet by mouth Daily.    carvedilol (COREG) 25 MG tablet Take 1 tablet by mouth 2 (Two) Times a Day With Meals.    fluticasone (Flonase) 50 MCG/ACT nasal spray 2 sprays into the nostril(s) as directed by provider Daily.    ibuprofen (ADVIL,MOTRIN) 200 MG tablet Take 3 tablets by mouth As Needed for Mild Pain.    lansoprazole (PREVACID) 30 MG capsule Take 1 capsule by mouth Daily.     "lisinopril (PRINIVIL,ZESTRIL) 10 MG tablet     rosuvastatin (CRESTOR) 5 MG tablet     simethicone (MYLICON) 80 MG chewable tablet Chew 1.5 tablets 4 (Four) Times a Day As Needed for Flatulence. (Patient not taking: Reported on 8/24/2023)     No current facility-administered medications on file prior to visit.      Venous Access Review  Line/IV site: No IV access at this time.  Antimicrobial Review  Currently on antibiotics/antifungals: YES/NO: YES  Start Date of Therapy: Doxycycline 8/17/2023  If therapy completed, date complete: 8/22/2023    Review of Systems neck pain that she had had previously at the time of her initial hospitalization has shown improvement.    Vital Signs:  /68 (BP Location: Left arm, Patient Position: Sitting, Cuff Size: Adult)   Pulse 76   Temp 98 øF (36.7 øC) (Infrared)   Ht 160 cm (63\")   Wt 68.6 kg (151 lb 3.2 oz)   LMP  (LMP Unknown)   SpO2 97%   BMI 26.78 kg/mý     Physical Exam  Vital signs - reviewed.  Alert, pleasant, no distress  Sclera nonicteric  Heart without murmur  Abdomen is soft and nontender  No hepatosplenomegaly    Lab/Imaging/Other Information:   Labs below were reviewed and discussed with her.  I was able to show her those lab results on paper and on computer in the exam room.  Ehrlichia Profile DNA PCR (08/14/2023 18:26)   Rickettsia Species DNA, Real-Time PCR (08/14/2023 18:26)   Comprehensive metabolic panel (08/24/2023 08:45)   CBC & Differential (08/24/2023 08:45)     Impression & Recommendations:   Diagnoses and all orders for this visit:    1. 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  She has shown improvement in all of those parameters with her course of doxycycline treatment.  Her Ehrlichia PCR was positive confirming a diagnosis of human monocytic ehrlichiosis.  The " only mild persistent abnormality she has on her laboratory testing at this time is a mild relative lymphocytosis and a minimal/insignificant elevation in ALT.  Suspect these are residual effects from Ehrlichia and will continue to resolve.  We discussed ways to help prevent/minimize risks of tickborne illness.  Do not feel she needs any additional treatment from ID standpoint at present.  I would be happy to reassess if any recurrent symptoms or problems.  She otherwise will keep follow-up with Dr. Louis her primary physician and will follow-up with infectious diseases as needed.    Follow Up:   There are no Patient Instructions on file for this visit.  Return if symptoms worsen or fail to improve.  Patient was provided After Visit Summary.     Daija Lopez MA    CC: Hilario Louis MD

## 2023-08-24 ENCOUNTER — LAB (OUTPATIENT)
Dept: LAB | Facility: HOSPITAL | Age: 63
End: 2023-08-24
Payer: MEDICARE

## 2023-08-24 ENCOUNTER — OFFICE VISIT (OUTPATIENT)
Age: 63
End: 2023-08-24
Payer: MEDICARE

## 2023-08-24 VITALS
HEART RATE: 76 BPM | OXYGEN SATURATION: 97 % | BODY MASS INDEX: 26.79 KG/M2 | HEIGHT: 63 IN | DIASTOLIC BLOOD PRESSURE: 68 MMHG | WEIGHT: 151.2 LBS | TEMPERATURE: 98 F | SYSTOLIC BLOOD PRESSURE: 102 MMHG

## 2023-08-24 DIAGNOSIS — A77.41 HUMAN EHRLICHIOSIS DUE TO EHRLICHIA CHAFFEENSIS: Primary | ICD-10-CM

## 2023-08-24 DIAGNOSIS — R76.8 POSITIVE SEROLOGY FOR ERLICHIOSIS: ICD-10-CM

## 2023-08-24 LAB
ALBUMIN SERPL-MCNC: 4.2 G/DL (ref 3.5–5.2)
ALBUMIN/GLOB SERPL: 1.8 G/DL
ALP SERPL-CCNC: 116 U/L (ref 39–117)
ALT SERPL W P-5'-P-CCNC: 42 U/L (ref 1–33)
ANION GAP SERPL CALCULATED.3IONS-SCNC: 8 MMOL/L (ref 5–15)
AST SERPL-CCNC: 26 U/L (ref 1–32)
BASOPHILS # BLD AUTO: 0.03 10*3/MM3 (ref 0–0.2)
BASOPHILS NFR BLD AUTO: 0.5 % (ref 0–1.5)
BILIRUB SERPL-MCNC: 0.5 MG/DL (ref 0–1.2)
BUN SERPL-MCNC: 16 MG/DL (ref 8–23)
BUN/CREAT SERPL: 21.3 (ref 7–25)
CALCIUM SPEC-SCNC: 9 MG/DL (ref 8.6–10.5)
CHLORIDE SERPL-SCNC: 105 MMOL/L (ref 98–107)
CO2 SERPL-SCNC: 27 MMOL/L (ref 22–29)
CREAT SERPL-MCNC: 0.75 MG/DL (ref 0.57–1)
DEPRECATED RDW RBC AUTO: 47.2 FL (ref 37–54)
EGFRCR SERPLBLD CKD-EPI 2021: 88.5 ML/MIN/1.73
EOSINOPHIL # BLD AUTO: 0.16 10*3/MM3 (ref 0–0.4)
EOSINOPHIL NFR BLD AUTO: 2.5 % (ref 0.3–6.2)
ERYTHROCYTE [DISTWIDTH] IN BLOOD BY AUTOMATED COUNT: 13.6 % (ref 12.3–15.4)
GLOBULIN UR ELPH-MCNC: 2.4 GM/DL
GLUCOSE SERPL-MCNC: 94 MG/DL (ref 65–99)
HCT VFR BLD AUTO: 35.3 % (ref 34–46.6)
HGB BLD-MCNC: 11.2 G/DL (ref 12–15.9)
IMM GRANULOCYTES # BLD AUTO: 0.05 10*3/MM3 (ref 0–0.05)
IMM GRANULOCYTES NFR BLD AUTO: 0.8 % (ref 0–0.5)
LYMPHOCYTES # BLD AUTO: 3.28 10*3/MM3 (ref 0.7–3.1)
LYMPHOCYTES NFR BLD AUTO: 51.3 % (ref 19.6–45.3)
MCH RBC QN AUTO: 30.1 PG (ref 26.6–33)
MCHC RBC AUTO-ENTMCNC: 31.7 G/DL (ref 31.5–35.7)
MCV RBC AUTO: 94.9 FL (ref 79–97)
MONOCYTES # BLD AUTO: 0.54 10*3/MM3 (ref 0.1–0.9)
MONOCYTES NFR BLD AUTO: 8.4 % (ref 5–12)
NEUTROPHILS NFR BLD AUTO: 2.34 10*3/MM3 (ref 1.7–7)
NEUTROPHILS NFR BLD AUTO: 36.5 % (ref 42.7–76)
NRBC BLD AUTO-RTO: 0 /100 WBC (ref 0–0.2)
PLATELET # BLD AUTO: 314 10*3/MM3 (ref 140–450)
PMV BLD AUTO: 10.1 FL (ref 6–12)
POTASSIUM SERPL-SCNC: 4.5 MMOL/L (ref 3.5–5.2)
PROT SERPL-MCNC: 6.6 G/DL (ref 6–8.5)
RBC # BLD AUTO: 3.72 10*6/MM3 (ref 3.77–5.28)
SODIUM SERPL-SCNC: 140 MMOL/L (ref 136–145)
WBC NRBC COR # BLD: 6.4 10*3/MM3 (ref 3.4–10.8)

## 2023-08-24 PROCEDURE — 36415 COLL VENOUS BLD VENIPUNCTURE: CPT

## 2023-08-24 PROCEDURE — 85025 COMPLETE CBC W/AUTO DIFF WBC: CPT

## 2023-08-24 PROCEDURE — 80053 COMPREHEN METABOLIC PANEL: CPT

## 2023-08-24 RX ORDER — ROSUVASTATIN CALCIUM 5 MG/1
TABLET, COATED ORAL
COMMUNITY

## 2023-08-24 RX ORDER — LISINOPRIL 10 MG/1
10 TABLET ORAL DAILY
COMMUNITY

## 2023-08-25 ENCOUNTER — OFFICE VISIT (OUTPATIENT)
Dept: INTERNAL MEDICINE | Facility: CLINIC | Age: 63
End: 2023-08-25
Payer: MEDICARE

## 2023-08-25 VITALS
HEART RATE: 78 BPM | DIASTOLIC BLOOD PRESSURE: 62 MMHG | OXYGEN SATURATION: 98 % | WEIGHT: 150 LBS | BODY MASS INDEX: 26.58 KG/M2 | HEIGHT: 63 IN | SYSTOLIC BLOOD PRESSURE: 104 MMHG

## 2023-08-25 DIAGNOSIS — Z09 HOSPITAL DISCHARGE FOLLOW-UP: Primary | ICD-10-CM

## 2023-08-25 DIAGNOSIS — D64.9 ANEMIA, UNSPECIFIED TYPE: ICD-10-CM

## 2023-08-25 DIAGNOSIS — I10 HYPERTENSION, UNSPECIFIED TYPE: ICD-10-CM

## 2023-08-25 DIAGNOSIS — E27.8 ADRENAL NODULE: ICD-10-CM

## 2023-08-25 DIAGNOSIS — N83.201 RIGHT OVARIAN CYST: ICD-10-CM

## 2023-08-25 DIAGNOSIS — J98.11 ATELECTASIS: ICD-10-CM

## 2023-08-25 RX ORDER — CARVEDILOL 12.5 MG/1
12.5 TABLET ORAL 2 TIMES DAILY WITH MEALS
Qty: 60 TABLET | Refills: 5 | Status: SHIPPED | OUTPATIENT
Start: 2023-08-25

## 2023-08-25 NOTE — PROGRESS NOTES
Transitional Care Follow Up Visit  Subjective     Melony Rincon is a 63 y.o. female who presents for a transitional care management visit.    Within 48 business hours after discharge our office contacted her via telephone to coordinate her care and needs.      I reviewed and discussed the details of that call along with the discharge summary, hospital problems, inpatient lab results, inpatient diagnostic studies, and consultation reports with Melony.     Current outpatient and discharge medications have been reconciled for the patient.  Reviewed by: HARI Mejía          8/17/2023     5:11 PM   Date of TCM Phone Call   [facility]   Date of Admission 8/14/2023   Date of Discharge 8/17/2023   Discharge Disposition Home or Self Care     Risk for Readmission (LACE) Score: 6 (8/17/2023  5:00 AM)      History of Present Illness   Course During Hospital Stay:  Admitted 8/14/23-8/17/23 with tick-borne illness, elevated LFT's, CONNOR. Labs stabilized and symptoms improved with doxycycline. She has done well and feeling good, back to normal. Labs returned to normal. She saw Infectious disease yesterday for follow up.    CT abdomen noted a right ovarian cyst, unusual for this age/postmenopausal state.  She has a routine GYN appointment in September. No complaints but had concern.    Mildly anemic and wondering about B12 level since on PPI chronically.    1.4 cm right adrenal nodule noted.    She noted the atelectasis on chest xray and wonders what to do about this.    Blood pressure is high in the mornings and low during the day. She is taking Coreg 25 mg only in the daytime because she feels her pressures are too low to take the evening dose.       The following portions of the patient's history were reviewed and updated as appropriate: allergies, current medications, past family history, past medical history, past social history, past surgical history, and problem list.    Review of Systems  As  above    Objective   Physical Exam  Vitals and nursing note reviewed.   Constitutional:       General: She is not in acute distress.     Appearance: Normal appearance. She is not ill-appearing, toxic-appearing or diaphoretic.   HENT:      Head: Normocephalic and atraumatic.   Cardiovascular:      Rate and Rhythm: Normal rate and regular rhythm.   Pulmonary:      Effort: Pulmonary effort is normal. No respiratory distress.      Breath sounds: No wheezing.   Abdominal:      General: There is no distension.      Palpations: Abdomen is soft.      Tenderness: There is no abdominal tenderness.   Musculoskeletal:      Cervical back: Neck supple.      Right lower leg: No edema.      Left lower leg: No edema.   Skin:     General: Skin is warm and dry.   Neurological:      Mental Status: She is alert and oriented to person, place, and time.   Psychiatric:         Mood and Affect: Mood normal.         Behavior: Behavior normal.         Thought Content: Thought content normal.         Judgment: Judgment normal.       Assessment & Plan   Diagnoses and all orders for this visit:    1. Hospital discharge follow-up (Primary)    2. Right ovarian cyst  -     US Non-ob Transvaginal; Future    3. Anemia, unspecified type  -     Vitamin B12; Future  -     Iron and TIBC; Future  -     Folate; Future  -     Iron level; Future    4. Atelectasis  -     Miscellaneous DME    5. Hypertension, unspecified type  -     carvedilol (COREG) 12.5 MG tablet; Take 1 tablet by mouth 2 (Two) Times a Day With Meals.  Dispense: 60 tablet; Refill: 5    6. Adrenal nodule  -     ACTH; Future  -     Cortisol - AM; Future  -     Aldosterone / Renin Ratio; Future  -     Metanephrines, Frac. Free, Plasma; Future      Orders as above.    Change Coreg to 12.5 mg BID. She will monitor BP at home and let us know how it is running.    After baseline ACTH and cortisol levels, will order Dexamethasone stim test with ACTH and cortisol repeats.    Shagufta Torres,  APRN

## 2023-08-30 ENCOUNTER — OFFICE VISIT (OUTPATIENT)
Dept: INTERNAL MEDICINE | Facility: CLINIC | Age: 63
End: 2023-08-30
Payer: MEDICARE

## 2023-08-30 VITALS
DIASTOLIC BLOOD PRESSURE: 70 MMHG | HEIGHT: 63 IN | SYSTOLIC BLOOD PRESSURE: 112 MMHG | WEIGHT: 146.8 LBS | BODY MASS INDEX: 26.01 KG/M2 | OXYGEN SATURATION: 99 % | HEART RATE: 60 BPM | TEMPERATURE: 97.3 F

## 2023-08-30 DIAGNOSIS — I80.8 SUPERFICIAL THROMBOPHLEBITIS OF RIGHT UPPER EXTREMITY: Primary | ICD-10-CM

## 2023-08-30 PROCEDURE — 99213 OFFICE O/P EST LOW 20 MIN: CPT | Performed by: INTERNAL MEDICINE

## 2023-08-30 PROCEDURE — 3074F SYST BP LT 130 MM HG: CPT | Performed by: INTERNAL MEDICINE

## 2023-08-30 PROCEDURE — 3078F DIAST BP <80 MM HG: CPT | Performed by: INTERNAL MEDICINE

## 2023-08-30 NOTE — PROGRESS NOTES
Chief Complaint   Patient presents with    Lump in Arm     Mentions lump to right arm where IV was located         History:  Melony Rincon is a 63 y.o. female who presents today for evaluation of the above problems.      Acute visit for lump and soreness on right wrist at the location of her IV. Started 4-5 days ago and hasn't gone away.  No redness of the skin.     Other than this she is continuing to feel better.    HPI      ROS:  Review of Systems    As above        Current Outpatient Medications:     aspirin 81 MG EC tablet, Take 1 tablet by mouth Daily., Disp: , Rfl:     carvedilol (COREG) 12.5 MG tablet, Take 1 tablet by mouth 2 (Two) Times a Day With Meals., Disp: 60 tablet, Rfl: 5    fluticasone (Flonase) 50 MCG/ACT nasal spray, 2 sprays into the nostril(s) as directed by provider Daily., Disp: 16 g, Rfl: 0    ibuprofen (ADVIL,MOTRIN) 200 MG tablet, Take 3 tablets by mouth As Needed for Mild Pain., Disp: , Rfl:     lansoprazole (PREVACID) 30 MG capsule, Take 1 capsule by mouth Daily., Disp: 90 capsule, Rfl: 3    lisinopril (PRINIVIL,ZESTRIL) 10 MG tablet, Take 1 tablet by mouth Daily., Disp: , Rfl:     rosuvastatin (CRESTOR) 5 MG tablet, , Disp: , Rfl:     Lab Results   Component Value Date    GLUCOSE 94 08/24/2023    BUN 16 08/24/2023    CREATININE 0.75 08/24/2023    EGFR 88.5 08/24/2023    BCR 21.3 08/24/2023    K 4.5 08/24/2023    CO2 27.0 08/24/2023    CALCIUM 9.0 08/24/2023    ALBUMIN 4.2 08/24/2023    BILITOT 0.5 08/24/2023    AST 26 08/24/2023    ALT 42 (H) 08/24/2023       WBC   Date Value Ref Range Status   08/24/2023 6.40 3.40 - 10.80 10*3/mm3 Final     RBC   Date Value Ref Range Status   08/24/2023 3.72 (L) 3.77 - 5.28 10*6/mm3 Final     Hemoglobin   Date Value Ref Range Status   08/24/2023 11.2 (L) 12.0 - 15.9 g/dL Final     Hematocrit   Date Value Ref Range Status   08/24/2023 35.3 34.0 - 46.6 % Final     MCV   Date Value Ref Range Status   08/24/2023 94.9 79.0 - 97.0 fL Final     MCH   Date  "Value Ref Range Status   08/24/2023 30.1 26.6 - 33.0 pg Final     MCHC   Date Value Ref Range Status   08/24/2023 31.7 31.5 - 35.7 g/dL Final     RDW   Date Value Ref Range Status   08/24/2023 13.6 12.3 - 15.4 % Final     RDW-SD   Date Value Ref Range Status   08/24/2023 47.2 37.0 - 54.0 fl Final     MPV   Date Value Ref Range Status   08/24/2023 10.1 6.0 - 12.0 fL Final     Platelets   Date Value Ref Range Status   08/24/2023 314 140 - 450 10*3/mm3 Final     Neutrophil %   Date Value Ref Range Status   08/24/2023 36.5 (L) 42.7 - 76.0 % Final     Lymphocyte %   Date Value Ref Range Status   08/24/2023 51.3 (H) 19.6 - 45.3 % Final     Monocyte %   Date Value Ref Range Status   08/24/2023 8.4 5.0 - 12.0 % Final     Eosinophil %   Date Value Ref Range Status   08/24/2023 2.5 0.3 - 6.2 % Final     Basophil %   Date Value Ref Range Status   08/24/2023 0.5 0.0 - 1.5 % Final     Immature Grans %   Date Value Ref Range Status   08/24/2023 0.8 (H) 0.0 - 0.5 % Final     Neutrophils, Absolute   Date Value Ref Range Status   08/24/2023 2.34 1.70 - 7.00 10*3/mm3 Final     Lymphocytes, Absolute   Date Value Ref Range Status   08/24/2023 3.28 (H) 0.70 - 3.10 10*3/mm3 Final     Monocytes, Absolute   Date Value Ref Range Status   08/24/2023 0.54 0.10 - 0.90 10*3/mm3 Final     Eosinophils, Absolute   Date Value Ref Range Status   08/24/2023 0.16 0.00 - 0.40 10*3/mm3 Final     Basophils, Absolute   Date Value Ref Range Status   08/24/2023 0.03 0.00 - 0.20 10*3/mm3 Final     Immature Grans, Absolute   Date Value Ref Range Status   08/24/2023 0.05 0.00 - 0.05 10*3/mm3 Final     nRBC   Date Value Ref Range Status   08/24/2023 0.0 0.0 - 0.2 /100 WBC Final         OBJECTIVE:  Visit Vitals  /70 (BP Location: Left arm, Patient Position: Sitting, Cuff Size: Adult)   Pulse 60   Temp 97.3 øF (36.3 øC) (Temporal)   Ht 160 cm (63\")   Wt 66.6 kg (146 lb 12.8 oz)   LMP  (LMP Unknown)   SpO2 99%   BMI 26.00 kg/mý      Physical Exam  Vitals and " nursing note reviewed.   Constitutional:       Appearance: Normal appearance.   Musculoskeletal:        Hands:    Skin:            Comments: Superficial thrombophlebitis the dorsal aspect of her right wrist.  No erythema proximally or distally.  Mild tenderness to palpation over the vein.  No distal edema       Assessment/Plan    Diagnoses and all orders for this visit:    1. Superficial thrombophlebitis of right upper extremity (Primary)      Melony has superficial thrombophlebitis secondary to recent IUD.  Recommend warm compresses 2 or 3 times per day.  She can continue taking baby aspirin to help with inflammation.  I have given her some information on her AVS regarding thrombophlebitis and we discussed the overall benign nature of this condition including no risk to move proximally.  She will let us know if her symptoms persist or worsen.    Return for Next scheduled follow up.      VICTORIA Louis MD  10:19 CDT  8/30/2023

## 2023-09-11 ENCOUNTER — HOSPITAL ENCOUNTER (OUTPATIENT)
Dept: ULTRASOUND IMAGING | Facility: HOSPITAL | Age: 63
Discharge: HOME | End: 2023-09-11
Admitting: NURSE PRACTITIONER
Payer: MEDICARE

## 2023-09-11 DIAGNOSIS — N83.201 RIGHT OVARIAN CYST: ICD-10-CM

## 2023-09-11 PROCEDURE — 76830 TRANSVAGINAL US NON-OB: CPT

## 2023-09-18 ENCOUNTER — LAB (OUTPATIENT)
Dept: LAB | Facility: HOSPITAL | Age: 63
End: 2023-09-18
Payer: MEDICARE

## 2023-09-18 DIAGNOSIS — D64.9 ANEMIA, UNSPECIFIED TYPE: ICD-10-CM

## 2023-09-18 DIAGNOSIS — E27.8 ADRENAL NODULE: ICD-10-CM

## 2023-09-18 DIAGNOSIS — D72.829 LEUKOCYTOSIS, UNSPECIFIED TYPE: ICD-10-CM

## 2023-09-18 LAB
BASOPHILS # BLD AUTO: 0.04 10*3/MM3 (ref 0–0.2)
BASOPHILS NFR BLD AUTO: 0.7 % (ref 0–1.5)
CORTIS SERPL-MCNC: 18 MCG/DL
DEPRECATED RDW RBC AUTO: 50.9 FL (ref 37–54)
EOSINOPHIL # BLD AUTO: 0.18 10*3/MM3 (ref 0–0.4)
EOSINOPHIL NFR BLD AUTO: 3.2 % (ref 0.3–6.2)
ERYTHROCYTE [DISTWIDTH] IN BLOOD BY AUTOMATED COUNT: 14.2 % (ref 12.3–15.4)
FOLATE SERPL-MCNC: 12.7 NG/ML (ref 4.78–24.2)
HCT VFR BLD AUTO: 40.1 % (ref 34–46.6)
HGB BLD-MCNC: 12.5 G/DL (ref 12–15.9)
IMM GRANULOCYTES # BLD AUTO: 0.02 10*3/MM3 (ref 0–0.05)
IMM GRANULOCYTES NFR BLD AUTO: 0.4 % (ref 0–0.5)
IRON 24H UR-MRATE: 125 MCG/DL (ref 37–145)
IRON SATN MFR SERPL: 37 % (ref 20–50)
LYMPHOCYTES # BLD AUTO: 2.24 10*3/MM3 (ref 0.7–3.1)
LYMPHOCYTES NFR BLD AUTO: 39.8 % (ref 19.6–45.3)
MCH RBC QN AUTO: 30.4 PG (ref 26.6–33)
MCHC RBC AUTO-ENTMCNC: 31.2 G/DL (ref 31.5–35.7)
MCV RBC AUTO: 97.6 FL (ref 79–97)
MONOCYTES # BLD AUTO: 0.28 10*3/MM3 (ref 0.1–0.9)
MONOCYTES NFR BLD AUTO: 5 % (ref 5–12)
NEUTROPHILS NFR BLD AUTO: 2.87 10*3/MM3 (ref 1.7–7)
NEUTROPHILS NFR BLD AUTO: 50.9 % (ref 42.7–76)
NRBC BLD AUTO-RTO: 0 /100 WBC (ref 0–0.2)
PLATELET # BLD AUTO: 179 10*3/MM3 (ref 140–450)
PMV BLD AUTO: 10.5 FL (ref 6–12)
RBC # BLD AUTO: 4.11 10*6/MM3 (ref 3.77–5.28)
TIBC SERPL-MCNC: 337 MCG/DL (ref 298–536)
TRANSFERRIN SERPL-MCNC: 226 MG/DL (ref 200–360)
VIT B12 BLD-MCNC: 271 PG/ML (ref 211–946)
WBC NRBC COR # BLD: 5.63 10*3/MM3 (ref 3.4–10.8)

## 2023-09-18 PROCEDURE — 82024 ASSAY OF ACTH: CPT

## 2023-09-18 PROCEDURE — 82533 TOTAL CORTISOL: CPT

## 2023-09-18 PROCEDURE — 84466 ASSAY OF TRANSFERRIN: CPT

## 2023-09-18 PROCEDURE — 82607 VITAMIN B-12: CPT

## 2023-09-18 PROCEDURE — 82746 ASSAY OF FOLIC ACID SERUM: CPT

## 2023-09-18 PROCEDURE — 83540 ASSAY OF IRON: CPT

## 2023-09-18 PROCEDURE — 84244 ASSAY OF RENIN: CPT

## 2023-09-18 PROCEDURE — 36415 COLL VENOUS BLD VENIPUNCTURE: CPT

## 2023-09-18 PROCEDURE — 83835 ASSAY OF METANEPHRINES: CPT

## 2023-09-18 PROCEDURE — 82088 ASSAY OF ALDOSTERONE: CPT

## 2023-09-18 PROCEDURE — 85025 COMPLETE CBC W/AUTO DIFF WBC: CPT

## 2023-09-19 LAB — ACTH PLAS-MCNC: 23.3 PG/ML (ref 7.2–63.3)

## 2023-09-21 LAB
METANEPH FREE SERPL-MCNC: 29.6 PG/ML (ref 0–88)
NORMETANEPHRINE SERPL-MCNC: 150.9 PG/ML (ref 0–285.2)

## 2023-09-25 LAB
ALDOST SERPL-MCNC: 8.5 NG/DL (ref 0–30)
ALDOST/RENIN PLAS-RTO: 16.9 {RATIO} (ref 0–30)
RENIN PLAS-CCNC: 0.5 NG/ML/HR (ref 0.17–5.38)

## 2023-09-27 DIAGNOSIS — E27.8 ADRENAL NODULE: Primary | ICD-10-CM

## 2023-09-27 RX ORDER — DEXAMETHASONE 1 MG
1 TABLET ORAL ONCE
Qty: 1 TABLET | Refills: 0 | Status: SHIPPED | OUTPATIENT
Start: 2023-09-27 | End: 2023-09-27

## 2023-09-29 DIAGNOSIS — I10 HYPERTENSION, UNSPECIFIED TYPE: ICD-10-CM

## 2023-09-29 RX ORDER — CARVEDILOL 12.5 MG/1
12.5 TABLET ORAL 2 TIMES DAILY WITH MEALS
Qty: 180 TABLET | Refills: 3 | Status: SHIPPED | OUTPATIENT
Start: 2023-09-29

## 2023-09-29 NOTE — TELEPHONE ENCOUNTER
[facility] pharmacy requesting 90 day prescription on carvedilol 25 mg tablet. Medication pended to chart for review.    Medication last filled 08/25/2023, qty 60, 5 refills

## 2023-11-11 DIAGNOSIS — K21.9 GASTROESOPHAGEAL REFLUX DISEASE, UNSPECIFIED WHETHER ESOPHAGITIS PRESENT: ICD-10-CM

## 2023-11-13 RX ORDER — LANSOPRAZOLE 30 MG/1
30 CAPSULE, DELAYED RELEASE ORAL DAILY
Qty: 90 CAPSULE | Refills: 3 | Status: SHIPPED | OUTPATIENT
Start: 2023-11-13

## 2023-11-13 NOTE — TELEPHONE ENCOUNTER
Rx Refill Note  Requested Prescriptions     Pending Prescriptions Disp Refills    lansoprazole (PREVACID) 30 MG capsule [Pharmacy Med Name: LANSOPRAZOLE DR 30 MG CAPSULE] 90 capsule 3     Sig: TAKE 1 CAPSULE BY MOUTH EVERY DAY      Last office visit with prescribing clinician: 8/30/2023   Last telemedicine visit with prescribing clinician: Visit date not found   Next office visit with prescribing clinician: 3/8/2024                         Would you like a call back once the refill request has been completed: [] Yes [] No    If the office needs to give you a call back, can they leave a voicemail: [] Yes [] No    Jimmie Sow MA  11/13/23, 08:53 CST

## 2023-12-13 RX ORDER — ROSUVASTATIN CALCIUM 5 MG/1
5 TABLET, COATED ORAL DAILY
Qty: 90 TABLET | Refills: 3 | Status: SHIPPED | OUTPATIENT
Start: 2023-12-13

## 2023-12-13 NOTE — TELEPHONE ENCOUNTER
Rx Refill Note  Requested Prescriptions     Pending Prescriptions Disp Refills    rosuvastatin (CRESTOR) 5 MG tablet [Pharmacy Med Name: ROSUVASTATIN CALCIUM 5 MG TAB] 90 tablet      Sig: TAKE 1 TABLET BY MOUTH EVERY DAY      Last office visit with prescribing clinician: 8/30/2023   Last telemedicine visit with prescribing clinician: Visit date not found   Next office visit with prescribing clinician: 3/8/2024                         Would you like a call back once the refill request has been completed: [] Yes [] No    If the office needs to give you a call back, can they leave a voicemail: [] Yes [] No    Jimmie Sow MA  12/13/23, 09:57 CST

## 2023-12-13 NOTE — TELEPHONE ENCOUNTER
Please confirm that she is currently taking and tolerating this medication.  Her chart lists statins as an allergy.  Thanks

## 2023-12-13 NOTE — TELEPHONE ENCOUNTER
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     #Pituitary macroadenoma:  #Cushing's disease:  #Pituitary apoplexy:  Unable to remove entire pituitary macroadenoma due to fibrous tissue. Cortisol level after surgery still elevated at 38.2 with repeat serum cortisol on 4/29 was at 27.4.  Blood pressure above target. Sodium WNL May 3, 2023 AM cortisol had improved to 15.1    Patient had episode of low Blood pressures and with the concern for possible glucocorticoid withdrawal syndrome was started on HC 20 in am and 10 in PM.   Review of chart shows that before he received his HC, his BG was at 126/82 mm Hg which is reassuring that he doesn't have AI.     We would need to quantify his cortisol levels to establish baseline and decide about options for EBRT v/s Medical treatment , as the post-op scans has shown there is residual mass.       - Continue Holding spironolactone to 200 mg per primary team. Resume when able.  - Will get daily A.M Cortisol.  - AM cortisol today is at  18.8  - Will get 24 hr UFC and creatinine to establish baseline before initiating medical treatment. Scheduled to get it done on wednesday  - Late night salivary cortisol x 2 ordered.( Wednesday and Thursday)  - Strict I's and O's       #Central hypothyroidism:  Prior to admission was on levothyroxine 100 mcg daily.  Free T4 on admission 1.3 and TSH not detectable  PATIENT HAS BEEN CALLED, SHE STATED THAT SHE IS TAKING ROSUVASTATIN AND IS TOLERATING IT WELL.    (picture of central hypothyroidism).  Repeat FT4 on 5/2 is at 0.96    Recommendations:  Now on levothyroxine to 112 mcg daily.  -Repeat FT on  May 11.     #DM type II: Hemoglobin A1c on admission 9.5%.  Prior to admission was on Sitagliptin 100 mg daily/metformin 500 mg twice daily, Jardiance 12.5 mg (not available) little higher overnight.  Will increase Lantus to 22 units.    - Lantus to 18 units daily. Might have to go down further based on his BG trends.  - Continue carb coverage to 1 units/20 g CHO with meals and snacks  - Changed to Medium-dose sliding scale insulin for AC & HS  - Stopped Jardiance 10 mg daily  - Continue hypoglycemia protocol  - Accu checks AC & HS      Patient was discussed with On call Attending   Patient labs, chart and imaging reviewed      Rhiannon Arroyo  Endocrinology Fellow  [phone]      ======================================================================    Subjective:  - Pituitary macroadenoma debulking surgery/resection(4/27/23)    Patient has bleeding from the wound in his leg. Hi Hb had drooped below 7 and had Blood transfused. He was found to have low BG in the night on 5/8 which later responded to fluids.        Physical Examination:  /78 (BP Location: Left arm)   Pulse 103   Temp 98.4  F (36.9  C) (Oral)   Resp 16   Wt 74.6 kg (164 lb 8 oz)   SpO2 100%   BMI 26.55 kg/m      Constitutional: healthy, alert and no distress   Respiratory: lungs CTAB. No increased work of breathing  Psychiatric: mentation appears normal and affect normal/bright  Head: Normocephalic.   Neck: Neck supple. Thyroid symmetric, normal size.  Abdomen: Abdomen soft, non-tender. BS normal. No masses, organomegaly  NEURO: Sensation grossly WNL.  JOINT/EXTREMITIES: +2-3 pitting bilateral lower extremity edema. Dressing for the right leg.    Medications:  Reviewed    Endocrine Labs:

## 2024-01-25 ENCOUNTER — PATIENT MESSAGE (OUTPATIENT)
Dept: INTERNAL MEDICINE | Facility: CLINIC | Age: 64
End: 2024-01-25
Payer: MEDICARE

## 2024-01-26 NOTE — TELEPHONE ENCOUNTER
Paxlovid has been sent to her pharmacy.  She will need to stop her Crestor while taking Paxlovid.  Thanks

## 2024-02-19 ENCOUNTER — TELEPHONE (OUTPATIENT)
Dept: INTERNAL MEDICINE | Facility: CLINIC | Age: 64
End: 2024-02-19
Payer: MEDICARE

## 2024-02-19 NOTE — TELEPHONE ENCOUNTER
PATIENT HAS CALLED, SHE STATED THAT SHE HAS BEEN IN [place] AND STATED THAT THE  HAD A TERRIBLE COUGH.    SHE STARTED GETTING A COUGH THAT STARTED ON WED.  MINOR IRRITATION AT THAT TIME BUT HAS PROGRESSED.  IT IS NON PRODUCTIVE.  SHE STATED THAT SHE FEELS IT IS IN HER BRONCHEAL AREA.   SHE STATED THAT SHE HAD A RASH ON HER ARMS OF WHICH IS GONE AND HER FEET DID BURN AND ITCH AS WELL OF WHICH IS GONE.   SHE HAS NO SHORTNESS OF BREATH AND NO FEVER.      THERE ARE NO OPENINGS SO PATIENT WILL GO TO THE URGENT CARE IN [place].

## 2024-02-23 ENCOUNTER — OFFICE VISIT (OUTPATIENT)
Dept: INTERNAL MEDICINE | Facility: CLINIC | Age: 64
End: 2024-02-23
Payer: MEDICARE

## 2024-02-23 VITALS
TEMPERATURE: 97.7 F | DIASTOLIC BLOOD PRESSURE: 88 MMHG | BODY MASS INDEX: 27.11 KG/M2 | SYSTOLIC BLOOD PRESSURE: 162 MMHG | WEIGHT: 153 LBS | OXYGEN SATURATION: 95 % | HEIGHT: 63 IN | HEART RATE: 82 BPM

## 2024-02-23 DIAGNOSIS — R21 RASH: ICD-10-CM

## 2024-02-23 DIAGNOSIS — J06.9 UPPER RESPIRATORY TRACT INFECTION, UNSPECIFIED TYPE: ICD-10-CM

## 2024-02-23 DIAGNOSIS — R05.1 ACUTE COUGH: Primary | ICD-10-CM

## 2024-02-23 PROCEDURE — 3077F SYST BP >= 140 MM HG: CPT | Performed by: NURSE PRACTITIONER

## 2024-02-23 PROCEDURE — 99213 OFFICE O/P EST LOW 20 MIN: CPT | Performed by: NURSE PRACTITIONER

## 2024-02-23 PROCEDURE — 3079F DIAST BP 80-89 MM HG: CPT | Performed by: NURSE PRACTITIONER

## 2024-02-23 RX ORDER — AZITHROMYCIN 250 MG/1
TABLET, FILM COATED ORAL
Qty: 6 TABLET | Refills: 0 | Status: SHIPPED | OUTPATIENT
Start: 2024-02-23

## 2024-02-23 RX ORDER — METHYLPREDNISOLONE 4 MG/1
TABLET ORAL
Qty: 21 TABLET | Refills: 0 | Status: SHIPPED | OUTPATIENT
Start: 2024-02-23

## 2024-02-23 NOTE — PATIENT INSTRUCTIONS
Medrol dose pack as directed  Zithromax as directed  Albuterol inhaler, 2 puffs every 4 hours as needed for cough, shortness of breath, wheeze or chest tightness.

## 2024-02-23 NOTE — PROGRESS NOTES
HARI Madrigal  [facility]   Family Medicine  [address]  [place] [address]  Phone: [phone]  Fax: [phone]         Chief Complaint:  Chief Complaint   Patient presents with    Cough     Getting worse for a couple of weeks    Rash     Off and on. Pt went to [place]     Ear Fullness     left        History:  Melony Rincon is a 63 y.o. female that is an established patient. She  is here for evaluation of the above complaint.    HPI     She was in [place] last week, developed cough 10 days ago while she was there.  At that time she feels like the cough has gotten worse, has had some green sputum.  She states she has sore throat from the cough and ribs hurting from her cough.  She is having some pressure in the left ear.  She feels like she was exposed to something environmental on the beach that started this.  However, she states that the  from the airport was also coughing quite a bit so she does not know what she has been exposed to.  She has tried Mucinex DM and Delsym and does have a La which she has used with benefit.    While she was in [place] she also had pruritic whelps on the arms and chest that developed the day after the cough.  She noticed those when she was also at the beach.  Up sequently, she also developed angling and itching of the bilateral feet and legs that lasted about 2 to 3 days.  No shortness of breath, nausea, vomiting, diarrhea, and those sensations and rashes have resolved spontaneously.             ROS:  Review of Systems   Constitutional:  Positive for fatigue. Negative for fever.   HENT:  Positive for postnasal drip and rhinorrhea.    Respiratory:  Positive for cough. Negative for shortness of breath and wheezing.    Gastrointestinal: Negative.    Genitourinary: Negative.    Skin:  Positive for rash.         reports that she quit smoking about 21 years ago. Her smoking use included cigarettes. She has a 40.00 pack-year smoking history.  "She has never used smokeless tobacco. She reports current alcohol use of about 10.0 standard drinks of alcohol per week. She reports that she does not use drugs.    Current Outpatient Medications   Medication Instructions    aspirin 81 mg, Oral, Daily    azithromycin (Zithromax Z-Elías) 250 MG tablet Take 2 tablets by mouth on day 1, then 1 tablet daily on days 2-5    carvedilol (COREG) 12.5 mg, Oral, 2 Times Daily With Meals    fluticasone (Flonase) 50 MCG/ACT nasal spray 2 sprays, Nasal, Daily    ibuprofen (ADVIL,MOTRIN) 600 mg, Oral, As Needed    lansoprazole (PREVACID) 30 mg, Oral, Daily    lisinopril (PRINIVIL,ZESTRIL) 10 MG tablet Take 1 tablet by mouth Daily.    methylPREDNISolone (MEDROL) 4 MG dose pack Take as directed on package instructions.    rosuvastatin (CRESTOR) 5 mg, Oral, Daily       OBJECTIVE:  /88 (BP Location: Right arm, Patient Position: Sitting, Cuff Size: Adult)   Pulse 82   Temp 97.7 °F (36.5 °C) (Oral)   Ht 160 cm (63\")   Wt 69.4 kg (153 lb)   LMP  (LMP Unknown)   SpO2 95%   BMI 27.10 kg/m²    Physical Exam  Vitals and nursing note reviewed.   Constitutional:       Appearance: Normal appearance.   HENT:      Head: Normocephalic and atraumatic.      Right Ear: Tympanic membrane, ear canal and external ear normal.      Left Ear: Tympanic membrane, ear canal and external ear normal.      Nose: Nose normal. Rhinorrhea present.      Mouth/Throat:      Mouth: Mucous membranes are moist.      Pharynx: No oropharyngeal exudate or posterior oropharyngeal erythema.   Eyes:      Conjunctiva/sclera: Conjunctivae normal.   Cardiovascular:      Rate and Rhythm: Normal rate and regular rhythm.   Pulmonary:      Effort: Pulmonary effort is normal. No respiratory distress.      Breath sounds: Normal breath sounds. No wheezing or rales.   Skin:     General: Skin is warm and dry.      Findings: No erythema or rash.   Neurological:      General: No focal deficit present.      Mental Status: She is " alert and oriented to person, place, and time.   Psychiatric:         Mood and Affect: Mood normal.         Behavior: Behavior normal.         Procedures    Assessment/Plan:     Diagnoses and all orders for this visit:    1. Acute cough (Primary)  -     methylPREDNISolone (MEDROL) 4 MG dose pack; Take as directed on package instructions.  Dispense: 21 tablet; Refill: 0  -     azithromycin (Zithromax Z-Elías) 250 MG tablet; Take 2 tablets by mouth on day 1, then 1 tablet daily on days 2-5  Dispense: 6 tablet; Refill: 0    2. Upper respiratory tract infection, unspecified type  -     methylPREDNISolone (MEDROL) 4 MG dose pack; Take as directed on package instructions.  Dispense: 21 tablet; Refill: 0  -     azithromycin (Zithromax Z-Elías) 250 MG tablet; Take 2 tablets by mouth on day 1, then 1 tablet daily on days 2-5  Dispense: 6 tablet; Refill: 0    3. Rash      BMI is >= 25 and <30. (Overweight) The following options were offered after discussion;: exercise counseling/recommendations and nutrition counseling/recommendations       An After Visit Summary was printed and given to the patient at discharge.  Return for Next scheduled follow up.       Patient Instructions   Medrol dose pack as directed  Zithromax as directed  Albuterol inhaler, 2 puffs every 4 hours as needed for cough, shortness of breath, wheeze or chest tightness.       Discussion:     I spent 25 minutes caring for Melony on this date of service. This time includes time spent by me in the following activities: preparing for the visit, reviewing tests, obtaining and/or reviewing a separately obtained history, performing a medically appropriate examination and/or evaluation, counseling and educating the patient/family/caregiver, ordering medications, tests, or procedures, documenting information in the medical record, and independently interpreting results and communicating that information with the patient/family/caregiver     Macrina NORIEGA 2/23/2024    Electronically signed.

## 2024-03-08 ENCOUNTER — OFFICE VISIT (OUTPATIENT)
Dept: INTERNAL MEDICINE | Facility: CLINIC | Age: 64
End: 2024-03-08
Payer: MEDICARE

## 2024-03-08 ENCOUNTER — LAB (OUTPATIENT)
Dept: LAB | Facility: HOSPITAL | Age: 64
End: 2024-03-08
Payer: MEDICARE

## 2024-03-08 VITALS
OXYGEN SATURATION: 97 % | HEART RATE: 76 BPM | BODY MASS INDEX: 27 KG/M2 | WEIGHT: 152.4 LBS | HEIGHT: 63 IN | TEMPERATURE: 97.4 F | SYSTOLIC BLOOD PRESSURE: 120 MMHG | DIASTOLIC BLOOD PRESSURE: 70 MMHG

## 2024-03-08 DIAGNOSIS — Z91.81 AT LOW RISK FOR FALL: ICD-10-CM

## 2024-03-08 DIAGNOSIS — E78.2 MIXED HYPERLIPIDEMIA: ICD-10-CM

## 2024-03-08 DIAGNOSIS — R91.8 MULTIPLE LUNG NODULES: ICD-10-CM

## 2024-03-08 DIAGNOSIS — Z00.00 INITIAL MEDICARE ANNUAL WELLNESS VISIT: Primary | ICD-10-CM

## 2024-03-08 DIAGNOSIS — Z12.31 ENCOUNTER FOR SCREENING MAMMOGRAM FOR MALIGNANT NEOPLASM OF BREAST: ICD-10-CM

## 2024-03-08 DIAGNOSIS — I10 ESSENTIAL HYPERTENSION: ICD-10-CM

## 2024-03-08 DIAGNOSIS — Z00.00 INITIAL MEDICARE ANNUAL WELLNESS VISIT: ICD-10-CM

## 2024-03-08 DIAGNOSIS — Z13.31 DEPRESSION SCREEN: ICD-10-CM
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
## 2024-03-08 LAB
ALBUMIN SERPL-MCNC: 4.2 G/DL (ref 3.5–5.2)
ALBUMIN/GLOB SERPL: 1.6 G/DL
ALP SERPL-CCNC: 92 U/L (ref 39–117)
ALT SERPL W P-5'-P-CCNC: 17 U/L (ref 1–33)
ANION GAP SERPL CALCULATED.3IONS-SCNC: 11 MMOL/L (ref 5–15)
AST SERPL-CCNC: 23 U/L (ref 1–32)
BILIRUB SERPL-MCNC: 0.4 MG/DL (ref 0–1.2)
BUN SERPL-MCNC: 14 MG/DL (ref 8–23)
BUN/CREAT SERPL: 16.3 (ref 7–25)
CALCIUM SPEC-SCNC: 9.1 MG/DL (ref 8.6–10.5)
CHLORIDE SERPL-SCNC: 101 MMOL/L (ref 98–107)
CHOLEST SERPL-MCNC: 261 MG/DL (ref 0–200)
CO2 SERPL-SCNC: 27 MMOL/L (ref 22–29)
CREAT SERPL-MCNC: 0.86 MG/DL (ref 0.57–1)
DEPRECATED RDW RBC AUTO: 49 FL (ref 37–54)
EGFRCR SERPLBLD CKD-EPI 2021: 75.1 ML/MIN/1.73
ERYTHROCYTE [DISTWIDTH] IN BLOOD BY AUTOMATED COUNT: 13.7 % (ref 12.3–15.4)
GLOBULIN UR ELPH-MCNC: 2.7 GM/DL
GLUCOSE SERPL-MCNC: 104 MG/DL (ref 65–99)
HBA1C MFR BLD: 5.2 % (ref 4.8–5.6)
HCT VFR BLD AUTO: 37.6 % (ref 34–46.6)
HDLC SERPL-MCNC: 68 MG/DL (ref 40–60)
HGB BLD-MCNC: 12 G/DL (ref 12–15.9)
LDLC SERPL CALC-MCNC: 140 MG/DL (ref 0–100)
LDLC/HDLC SERPL: 1.97 {RATIO}
MCH RBC QN AUTO: 31.1 PG (ref 26.6–33)
MCHC RBC AUTO-ENTMCNC: 31.9 G/DL (ref 31.5–35.7)
MCV RBC AUTO: 97.4 FL (ref 79–97)
PLATELET # BLD AUTO: 185 10*3/MM3 (ref 140–450)
PMV BLD AUTO: 10.9 FL (ref 6–12)
POTASSIUM SERPL-SCNC: 4.3 MMOL/L (ref 3.5–5.2)
PROT SERPL-MCNC: 6.9 G/DL (ref 6–8.5)
RBC # BLD AUTO: 3.86 10*6/MM3 (ref 3.77–5.28)
SODIUM SERPL-SCNC: 139 MMOL/L (ref 136–145)
TRIGL SERPL-MCNC: 294 MG/DL (ref 0–150)
VLDLC SERPL-MCNC: 53 MG/DL (ref 5–40)
WBC NRBC COR # BLD AUTO: 6.29 10*3/MM3 (ref 3.4–10.8)

## 2024-03-08 PROCEDURE — 85027 COMPLETE CBC AUTOMATED: CPT

## 2024-03-08 PROCEDURE — 80061 LIPID PANEL: CPT

## 2024-03-08 PROCEDURE — 80053 COMPREHEN METABOLIC PANEL: CPT

## 2024-03-08 PROCEDURE — 36415 COLL VENOUS BLD VENIPUNCTURE: CPT

## 2024-03-08 PROCEDURE — 83036 HEMOGLOBIN GLYCOSYLATED A1C: CPT

## 2024-03-08 RX ORDER — ALBUTEROL SULFATE 90 UG/1
AEROSOL, METERED RESPIRATORY (INHALATION)
COMMUNITY
Start: 2024-02-20

## 2024-03-08 NOTE — PROGRESS NOTES
The ABCs of the Annual Wellness Visit  Initial Medicare Wellness Visit    Subjective     Melony Rincon is a 63 y.o. female who presents for an Initial Medicare Wellness Visit.    Overall, Milana is doing well.  She was seen a couple weeks ago in the office for acute cough shortly after returning from [place].  She still has a slight cough but is much improved.    The following portions of the patient's history were reviewed and   updated as appropriate: allergies, current medications, past family history, past medical history, past social history, past surgical history, and problem list.     Compared to one year ago, the patient feels her physical   health is the same.    Compared to one year ago, the patient feels her mental   health is the same.    Recent Hospitalizations:  This patient has had a [facility] admission record on file within the last 365 days.    Current Medical Providers:  Patient Care Team:  ANDRÉS Louis MD as PCP - General (Internal Medicine)  Marilyn Davies APRN as Nurse Practitioner (Family Medicine)    Outpatient Medications Prior to Visit   Medication Sig Dispense Refill    albuterol sulfate  (90 Base) MCG/ACT inhaler 2 PUFFS EVERY 4-6 HOURS AS NEEDED FOR WHEEZING FOR 15 DAYS      aspirin 81 MG EC tablet Take 1 tablet by mouth Daily.      carvedilol (COREG) 12.5 MG tablet Take 1 tablet by mouth 2 (Two) Times a Day With Meals. 180 tablet 3    fluticasone (Flonase) 50 MCG/ACT nasal spray 2 sprays into the nostril(s) as directed by provider Daily. 16 g 0    ibuprofen (ADVIL,MOTRIN) 200 MG tablet Take 3 tablets by mouth As Needed for Mild Pain.      lansoprazole (PREVACID) 30 MG capsule Take 1 capsule by mouth Daily. 90 capsule 3    lisinopril (PRINIVIL,ZESTRIL) 10 MG tablet Take 1 tablet by mouth Daily.      rosuvastatin (CRESTOR) 5 MG tablet Take 1 tablet by mouth Daily. 90 tablet 3    azithromycin (Zithromax Z-Elías) 250 MG tablet Take 2 tablets by mouth on day 1, then 1  "tablet daily on days 2-5 6 tablet 0    methylPREDNISolone (MEDROL) 4 MG dose pack Take as directed on package instructions. 21 tablet 0     No facility-administered medications prior to visit.       No opioid medication identified on active medication list. I have reviewed chart for other potential  high risk medication/s and harmful drug interactions in the elderly.        Aspirin is on active medication list. Aspirin use is indicated based on review of current medical condition/s. Pros and cons of this therapy have been discussed today. Benefits of this medication outweigh potential harm.  Patient has been encouraged to continue taking this medication.  .      Patient Active Problem List   Diagnosis    Essential hypertension    Mixed hyperlipidemia    Former smoker    GERD (gastroesophageal reflux disease)    Tobacco use disorder, moderate, in sustained remission    Acute renal failure    Thrombocytopenia    Ovarian cyst    Transaminitis     Advance Care Planning   Advance Care Planning     Advance Directive is not on file.  ACP discussion was held with the patient during this visit. Patient does not have an advance directive, information provided.       Objective    Vitals:    03/08/24 0852   BP: 120/70   BP Location: Left arm   Patient Position: Sitting   Cuff Size: Adult   Pulse: 76   Temp: 97.4 °F (36.3 °C)   TempSrc: Temporal   SpO2: 97%   Weight: 69.1 kg (152 lb 6.4 oz)   Height: 160 cm (63\")   Physical exam was unremarkable.  Estimated body mass index is 27 kg/m² as calculated from the following:    Height as of this encounter: 160 cm (63\").    Weight as of this encounter: 69.1 kg (152 lb 6.4 oz).      Does the patient have evidence of cognitive impairment?   No          HEALTH RISK ASSESSMENT    Smoking Status:  Social History     Tobacco Use   Smoking Status Former    Current packs/day: 0.00    Average packs/day: 2.0 packs/day for 20.0 years (40.0 ttl pk-yrs)    Types: Cigarettes    Start date: 1983    " Quit date:     Years since quittin.1   Smokeless Tobacco Never     Alcohol Consumption:  Social History     Substance and Sexual Activity   Alcohol Use Yes    Alcohol/week: 10.0 standard drinks of alcohol    Types: 10 Glasses of wine per week    Comment: >3 times a week     Fall Risk Screen:    IWONA Fall Risk Assessment was completed, and patient is at LOW risk for falls.Assessment completed on:3/8/2024    Depression Screen:       3/8/2024     8:00 AM   PHQ-2/PHQ-9 Depression Screening   Little Interest or Pleasure in Doing Things 0-->not at all   Feeling Down, Depressed or Hopeless 0-->not at all   PHQ-9: Brief Depression Severity Measure Score 0       Health Habits and Functional and Cognitive Screening:      3/8/2024     8:00 AM   Functional & Cognitive Status   Do you have difficulty preparing food and eating? No   Do you have difficulty bathing yourself, getting dressed or grooming yourself? No   Do you have difficulty using the toilet? No   Do you have difficulty moving around from place to place? No   Do you have trouble with steps or getting out of a bed or a chair? No   Current Diet Well Balanced Diet   Dental Exam Up to date   Eye Exam Up to date   Exercise (times per week) 0 times per week   Current Exercises Include No Regular Exercise   Do you need help using the phone?  No   Are you deaf or do you have serious difficulty hearing?  No   Do you need help to go to places out of walking distance? No   Do you need help shopping? No   Do you need help preparing meals?  No   Do you need help with housework?  No   Do you need help with laundry? No   Do you need help taking your medications? No   Do you need help managing money? No   Do you ever drive or ride in a car without wearing a seat belt? No   Have you felt unusual stress, anger or loneliness in the last month? No   Who do you live with? Spouse   If you need help, do you have trouble finding someone available to you? No   Have you been  bothered in the last four weeks by sexual problems? No   Do you have difficulty concentrating, remembering or making decisions? No       Age-appropriate Screening Schedule:  Refer to the list below for future screening recommendations based on patient's age, sex and/or medical conditions. Orders for these recommended tests are listed in the plan section. The patient has been provided with a written plan.    Health Maintenance   Topic Date Due    TDAP/TD VACCINES (1 - Tdap) Never done    ZOSTER VACCINE (1 of 2) Never done    RSV Vaccine - Adults (1 - 1-dose 60+ series) Never done    Pneumococcal Vaccine 65+ (1 of 1 - PCV) Never done    ANNUAL WELLNESS VISIT  03/06/2024    DXA SCAN  05/17/2024    MAMMOGRAM  06/19/2024    LIPID PANEL  08/15/2024    BMI FOLLOWUP  02/23/2025    COLORECTAL CANCER SCREENING  03/30/2026    HEPATITIS C SCREENING  Completed    COVID-19 Vaccine  Completed    INFLUENZA VACCINE  Completed          CMS Preventative Services Quick Reference  Risk Factors Identified During Encounter    Immunizations Discussed/Encouraged: Prevnar 20 (Pneumococcal 20-valent conjugate), Shingrix, and RSV (Respiratory Syncytial Virus).  She declines vaccines today.    The above risks/problems have been discussed with the patient.  Pertinent information has been shared with the patient in the After Visit Summary.  An After Visit Summary and PPPS were made available to the patient.    Diagnoses and all orders for this visit:    1. Initial Medicare annual wellness visit (Primary)  -     CBC (No Diff); Future  -     Comprehensive Metabolic Panel; Future  -     Hemoglobin A1c; Future  -     Lipid Panel; Future    2. Depression screen    3. At low risk for fall    4. Essential hypertension  -     Comprehensive Metabolic Panel; Future    5. Mixed hyperlipidemia  -     Lipid Panel; Future    6. Encounter for screening mammogram for malignant neoplasm of breast  -     Mammo Screening Digital Tomosynthesis Bilateral With CAD;  Future    7. Multiple lung nodules  -     CT Chest Without Contrast; Future      Obtain CT chest without contrast to follow-up on lung nodules in August.    Mammogram to be done in June.    Blood work today.  Further workup or management depending on those results.    Follow Up:  Next Medicare Wellness visit to be scheduled in 1 year.      ANDRÉS Louis MD  03/08/2024  Electronically signed by ANDRÉS Louis MD  03/08/24, 9:41 AM CST

## 2024-03-11 ENCOUNTER — PATIENT MESSAGE (OUTPATIENT)
Dept: INTERNAL MEDICINE | Facility: CLINIC | Age: 64
End: 2024-03-11
Payer: MEDICARE

## 2024-03-11 RX ORDER — LISINOPRIL 10 MG/1
10 TABLET ORAL DAILY
Qty: 90 TABLET | Refills: 3 | Status: SHIPPED | OUTPATIENT
Start: 2024-03-11

## 2024-05-29 ENCOUNTER — OFFICE VISIT (OUTPATIENT)
Dept: OBSTETRICS AND GYNECOLOGY | Age: 64
End: 2024-05-29
Payer: MEDICARE

## 2024-05-29 VITALS
DIASTOLIC BLOOD PRESSURE: 90 MMHG | HEIGHT: 63 IN | BODY MASS INDEX: 26.4 KG/M2 | WEIGHT: 149 LBS | SYSTOLIC BLOOD PRESSURE: 174 MMHG

## 2024-05-29 DIAGNOSIS — K64.4 EXTERNAL HEMORRHOIDS: ICD-10-CM

## 2024-05-29 DIAGNOSIS — Z78.0 MENOPAUSE: ICD-10-CM

## 2024-05-29 DIAGNOSIS — F51.01 PRIMARY INSOMNIA: ICD-10-CM

## 2024-05-29 DIAGNOSIS — Z01.419 ENCOUNTER FOR GYNECOLOGICAL EXAMINATION WITHOUT ABNORMAL FINDING: Primary | ICD-10-CM

## 2024-05-29 NOTE — PROGRESS NOTES
"Subjective   Chief Complaint   Patient presents with    Annual Exam     Pt here today for annual exam. Pt last mammo 2023 and had one scheduled for  this year. Pt next colonoscopy . Pt wanting to discuss hemorrhoids. Pt voices no other concerns.      Melony Rincon is a 64 y.o. year old  menopausal female presenting to be seen for her annual exam.  Overall, the patient reports to be feeling pretty good except for allergy congestion (allergies have been worse since moving her two years ago).  She does sleep very light and reports waking frequently during the night (due to neck pain or 's snoring).  The patient denies any vaginal bleeding in the past 12 months  Hot flashes and night sweats ARE a still significant problem - night sweats almost every night.  Patient not interested in medication.    She is sexually active.  In the past year there has not been new sexual partners.  Patient denies any pain or problems with sex life.     Patient reports regular self breast exams: no.  Patient does report a deep \"itching or tingling\" sensation in L breast that has been present for about 4 years.  Mammograms have all been normal and her last GYN didn't think much of it.  Sensation is not painful.  It is intermittent.  At present, she has not had the itching sensation for about a month.  Screening mammogram for this year already scheduled for next month    She exercises regularly: no.  She has noticed changes in height: yes.    No Additional Complaints Reported    The following portions of the patient's history were reviewed and updated as appropriate:problem list, current medications, allergies, past family history, past medical history, past social history, and past surgical history.  Social History    Tobacco Use      Smoking status: Former        Packs/day: 0.00        Years: 2.0 packs/day for 20.0 years (40.0 ttl pk-yrs)        Types: Cigarettes        Start date:         Quit date:        " " Years since quittin.4      Smokeless tobacco: Never    Review of Systems   Constitutional:  Negative for activity change and unexpected weight change.   HENT:  Negative for congestion.    Respiratory:  Negative for shortness of breath.    Cardiovascular:  Negative for chest pain.   Gastrointestinal:  Positive for blood in stool (bright red, from hemorrhoids). Negative for abdominal pain, constipation and diarrhea.        Colonoscopy due in    Endocrine: Positive for heat intolerance. Negative for cold intolerance.   Genitourinary:  Negative for difficulty urinating, dyspareunia, dysuria, enuresis, pelvic pain, vaginal bleeding, vaginal discharge and vaginal pain.   Musculoskeletal:  Positive for arthralgias, back pain, neck pain and neck stiffness.   Skin:  Negative for rash.   Neurological:  Positive for headaches (frequently, attributes to neck problems). Negative for dizziness.   Psychiatric/Behavioral:  Positive for sleep disturbance. Negative for dysphoric mood. The patient is not nervous/anxious.          Objective   /90   Ht 160 cm (63\")   Wt 67.6 kg (149 lb)   LMP  (LMP Unknown)   BMI 26.39 kg/m²   Physical Exam  Vitals and nursing note reviewed. Exam conducted with a chaperone present.   Constitutional:       General: She is not in acute distress.     Appearance: She is well-developed.   HENT:      Head: Normocephalic and atraumatic.   Cardiovascular:      Rate and Rhythm: Normal rate and regular rhythm.      Heart sounds: No murmur heard.  Pulmonary:      Effort: Pulmonary effort is normal.      Breath sounds: Normal breath sounds.   Chest:   Breasts:     Right: No inverted nipple or mass.      Left: No inverted nipple or mass.   Abdominal:      General: There is no distension.      Palpations: Abdomen is soft.      Tenderness: There is no abdominal tenderness.   Genitourinary:     General: Normal vulva.      Exam position: Lithotomy position.      Labia:         Right: No tenderness or " "lesion.         Left: No tenderness or lesion.       Vagina: Normal. No vaginal discharge, tenderness or bleeding.      Cervix: No cervical motion tenderness, discharge or friability.      Adnexa:         Right: No tenderness or fullness.          Left: No tenderness or fullness.        Rectum: Normal.   Musculoskeletal:         General: Normal range of motion.      Cervical back: Normal range of motion and neck supple.   Skin:     General: Skin is warm and dry.   Neurological:      Mental Status: She is alert and oriented to person, place, and time.   Psychiatric:         Behavior: Behavior normal.         Judgment: Judgment normal.            Assessment & Plan    Diagnoses and all orders for this visit:    1. Encounter for gynecological examination without abnormal finding (Primary): Exam unremarkable.  BMI normal.  Regular SBE encouraged; this year's mammogram is already scheduled for next month.  Patient does report a deep \"itching\" sensation in her breast that is intermittent, but says it has been stable for several years and all mammograms have been normal.  I have encouraged her to try vitamin E supplementation, as this is frequently helpful with mastalgia and may also help with this uncomfortable sensation, even though patient does not describe as \"painful\".  Routine screening labs with PCP.  Colonoscopy due again in 2026.  DEXA was normal in 2022; will repeat in 5 years.  PAP was normal in 2022 and patient is over 65; PAP not indicated at this time.    2. External hemorrhoids: Patient would like to have surgically addressed.  Referral placed  -     Ambulatory Referral to General Surgery    3. Primary insomnia: Offered Elavil to help with menopausal hot flashes and night sweats, as well as to help with sleep.  Patient does not want medication at this time.    4. Menopause: Patient reports frequent symptoms but is not bothered enough that she would be willing to start a new medication          This note was " electronically signed.    Laila Barriga MD  5/29/2024  09:57 CDT

## 2024-06-19 ENCOUNTER — TELEPHONE (OUTPATIENT)
Dept: SURGERY | Facility: CLINIC | Age: 64
End: 2024-06-19
Payer: MEDICARE

## 2024-06-20 ENCOUNTER — HOSPITAL ENCOUNTER (OUTPATIENT)
Dept: MAMMOGRAPHY | Facility: HOSPITAL | Age: 64
Discharge: HOME | End: 2024-06-20
Admitting: INTERNAL MEDICINE
Payer: MEDICARE

## 2024-06-20 DIAGNOSIS — Z12.31 ENCOUNTER FOR SCREENING MAMMOGRAM FOR MALIGNANT NEOPLASM OF BREAST: ICD-10-CM

## 2024-06-20 PROCEDURE — 77067 SCR MAMMO BI INCL CAD: CPT

## 2024-06-20 PROCEDURE — 77063 BREAST TOMOSYNTHESIS BI: CPT

## 2024-08-13 NOTE — TELEPHONE ENCOUNTER
Can you please find out if she has been monitoring her blood pressure at home, if so can she let us know what these readings are.  You

## 2024-08-13 NOTE — TELEPHONE ENCOUNTER
Rx Refill Note  Requested Prescriptions     Pending Prescriptions Disp Refills    lisinopril (PRINIVIL,ZESTRIL) 10 MG tablet 90 tablet 3     Sig: Take 1 tablet by mouth Daily.      Last office visit with prescribing clinician: 3/8/2024   Last telemedicine visit with prescribing clinician: Visit date not found   Next office visit with prescribing clinician: 3/12/2025                         Would you like a call back once the refill request has been completed: [] Yes [] No    If the office needs to give you a call back, can they leave a voicemail: [] Yes [] No    CHRISTIANO Lui  08/13/24, 15:40 CDT

## 2024-08-14 NOTE — TELEPHONE ENCOUNTER
PATIENT HAS BEEN CALLED, SHE STATED THAT SHE WILL UPLOAD THE BLOOD PRESSURE READINGS TO HER MY CHART AND SEND THEM.

## 2024-08-15 ENCOUNTER — HOSPITAL ENCOUNTER (OUTPATIENT)
Dept: CT IMAGING | Facility: HOSPITAL | Age: 64
Discharge: HOME | End: 2024-08-15
Admitting: INTERNAL MEDICINE
Payer: MEDICARE

## 2024-08-15 DIAGNOSIS — R91.8 MULTIPLE LUNG NODULES: ICD-10-CM

## 2024-08-15 PROCEDURE — 71250 CT THORAX DX C-: CPT

## 2024-08-15 RX ORDER — LISINOPRIL 10 MG/1
10 TABLET ORAL DAILY
Qty: 90 TABLET | Refills: 3 | Status: SHIPPED | OUTPATIENT
Start: 2024-08-15

## 2024-08-16 DIAGNOSIS — R91.8 MULTIPLE LUNG NODULES: Primary | ICD-10-CM

## 2024-11-21 ENCOUNTER — HOSPITAL ENCOUNTER (OUTPATIENT)
Dept: CT IMAGING | Facility: HOSPITAL | Age: 64
Discharge: HOME | End: 2024-11-21
Admitting: INTERNAL MEDICINE
Payer: MEDICARE

## 2024-11-21 DIAGNOSIS — R91.8 MULTIPLE LUNG NODULES: ICD-10-CM

## 2024-11-21 PROCEDURE — 71250 CT THORAX DX C-: CPT

## 2024-11-22 DIAGNOSIS — I10 HYPERTENSION, UNSPECIFIED TYPE: ICD-10-CM

## 2024-11-22 RX ORDER — CARVEDILOL 12.5 MG/1
12.5 TABLET ORAL 2 TIMES DAILY WITH MEALS
Qty: 180 TABLET | Refills: 3 | Status: SHIPPED | OUTPATIENT
Start: 2024-11-22

## 2024-11-22 NOTE — TELEPHONE ENCOUNTER
Rx Refill Note  Requested Prescriptions     Pending Prescriptions Disp Refills    carvedilol (COREG) 12.5 MG tablet [Pharmacy Med Name: CARVEDILOL 12.5 MG TABLET] 180 tablet 3     Sig: TAKE 1 TABLET BY MOUTH TWICE A DAY WITH MEALS      Last office visit with prescribing clinician: 3/8/2024   Last telemedicine visit with prescribing clinician: Visit date not found   Next office visit with prescribing clinician: 3/12/2025                         Would you like a call back once the refill request has been completed: [] Yes [] No    If the office needs to give you a call back, can they leave a voicemail: [] Yes [] No    Jimmie Sow MA  11/22/24, 08:03 CST

## 2024-12-01 DIAGNOSIS — K21.9 GASTROESOPHAGEAL REFLUX DISEASE, UNSPECIFIED WHETHER ESOPHAGITIS PRESENT: ICD-10-CM

## 2024-12-02 RX ORDER — LANSOPRAZOLE 30 MG/1
30 CAPSULE, DELAYED RELEASE ORAL DAILY
Qty: 90 CAPSULE | Refills: 3 | Status: SHIPPED | OUTPATIENT
Start: 2024-12-02

## 2024-12-02 NOTE — TELEPHONE ENCOUNTER
Rx Refill Note  Requested Prescriptions     Pending Prescriptions Disp Refills    lansoprazole (PREVACID) 30 MG capsule [Pharmacy Med Name: LANSOPRAZOLE DR 30 MG CAPSULE] 90 capsule 3     Sig: TAKE 1 CAPSULE BY MOUTH EVERY DAY      Last office visit with prescribing clinician: 3/8/2024   Last telemedicine visit with prescribing clinician: Visit date not found   Next office visit with prescribing clinician: 3/12/2025                         Would you like a call back once the refill request has been completed: [] Yes [] No    If the office needs to give you a call back, can they leave a voicemail: [] Yes [] No    Jimmie Sow MA  12/02/24, 09:40 CST

## 2024-12-27 RX ORDER — ROSUVASTATIN CALCIUM 5 MG/1
5 TABLET, COATED ORAL DAILY
Qty: 90 TABLET | Refills: 3 | Status: SHIPPED | OUTPATIENT
Start: 2024-12-27

## 2024-12-27 NOTE — TELEPHONE ENCOUNTER
Rx Refill Note  Requested Prescriptions     Pending Prescriptions Disp Refills    rosuvastatin (CRESTOR) 5 MG tablet [Pharmacy Med Name: ROSUVASTATIN CALCIUM 5 MG TAB] 90 tablet 3     Sig: TAKE 1 TABLET BY MOUTH EVERY DAY      Last office visit with prescribing clinician: 3/8/2024   Last telemedicine visit with prescribing clinician: Visit date not found   Next office visit with prescribing clinician: 3/12/2025                         Would you like a call back once the refill request has been completed: [] Yes [] No    If the office needs to give you a call back, can they leave a voicemail: [] Yes [] No    Jimmie Sow MA  12/27/24, 08:51 CST

## 2025-01-07 ENCOUNTER — TELEMEDICINE (OUTPATIENT)
Dept: FAMILY MEDICINE CLINIC | Facility: TELEHEALTH | Age: 65
End: 2025-01-07
Payer: MEDICARE

## 2025-01-07 VITALS — TEMPERATURE: 98.7 F

## 2025-01-07 DIAGNOSIS — J40 BRONCHITIS: Primary | ICD-10-CM

## 2025-01-07 PROCEDURE — 99213 OFFICE O/P EST LOW 20 MIN: CPT | Performed by: NURSE PRACTITIONER

## 2025-01-07 RX ORDER — METHYLPREDNISOLONE 4 MG/1
TABLET ORAL
Qty: 21 TABLET | Refills: 0 | Status: SHIPPED | OUTPATIENT
Start: 2025-01-07

## 2025-01-07 NOTE — PROGRESS NOTES
Mode of Visit: Video  Location of patient: -HOME-  Location of provider: +HOME+  You have chosen to receive care through a telehealth visit.  The patient has signed the video visit consent form.  The visit included audio and video interaction. No technical issues occurred during this visit.    LIAN Rincon is a 64 y.o. female  presents with complaint of cough.  She reports that symptoms started last Tuesday night so she am on day 7.  She reports a dry cough, stuffy sinuses, low grade fever which is gone and a headache.  She also reports that she has been taking Mucinex DM but the cough gets uncontrollable at times.  Additionally she reports that she had one episode so severe last night that she could barely catch her breath and thought  might pass out.  She also reports that her ribs are sore and she is exhausted.  All symptoms she is having are noted in the ROS portion of this visit.  She has taken Mucinex for her symptoms.  She has used her albuterol inhaler a couple times without much relief from her symptoms.  She is also taken Motrin for the headache.    Review of Systems   Constitutional:  Positive for fatigue (from coughing) and fever (resolved).   HENT:  Positive for congestion (light green, clear), postnasal drip and sinus pressure. Sore throat: tickle in throat.   Respiratory:  Positive for cough and wheezing (with cough). Negative for shortness of breath.         Persistent, dry cough   Musculoskeletal:  Positive for myalgias (ribs hurt from coughing).       Past Medical History:   Diagnosis Date    Abnormal Pap smear of cervix     Allergic     Arthritis     COPD (chronic obstructive pulmonary disease)     Coronary artery disease 2012    GERD (gastroesophageal reflux disease)     Hyperlipidemia     Hypertension     Myocardial infarction     Shingles     Tick bite 08/14/2023       Family History   Problem Relation Age of Onset    Heart attack Mother     Hypertension Mother     Early death Mother      Heart disease Mother     Hyperlipidemia Mother     Prostate cancer Father     Alcohol abuse Father     Cancer Father     Diabetes Father     Heart attack Father     Hypertension Father     Arthritis Father     Hearing loss Father     Heart disease Father     Hyperlipidemia Father     Breast cancer Sister 59    Cancer Sister     Hypertension Sister     Alcohol abuse Brother     Cancer Brother     Hypertension Brother     COPD Brother     Heart disease Brother     Hyperlipidemia Brother     Cancer Brother     Hypertension Brother     Early death Brother     Diabetes Maternal Grandmother     Stroke Maternal Grandmother     Ovarian cancer Paternal Grandmother 65    Cancer Paternal Grandmother     Alcohol abuse Maternal Grandfather     Breast cancer Cousin 55    Breast cancer Cousin 55    Breast cancer Cousin 58    Uterine cancer Neg Hx     Colon cancer Neg Hx     Melanoma Neg Hx        Social History     Socioeconomic History    Marital status:    Tobacco Use    Smoking status: Former     Current packs/day: 0.00     Average packs/day: 2.0 packs/day for 20.0 years (40.0 ttl pk-yrs)     Types: Cigarettes     Start date:      Quit date:      Years since quittin.0    Smokeless tobacco: Never   Vaping Use    Vaping status: Never Used   Substance and Sexual Activity    Alcohol use: Yes     Alcohol/week: 10.0 standard drinks of alcohol     Types: 10 Glasses of wine per week     Comment: >3 times a week    Drug use: Never    Sexual activity: Yes     Partners: Male     Birth control/protection: Post-menopausal       Melony Rincon  reports that she quit smoking about 22 years ago. Her smoking use included cigarettes. She started smoking about 42 years ago. She has a 40 pack-year smoking history. She has never used smokeless tobacco.       Temp 98.7 °F (37.1 °C)   LMP  (LMP Unknown)   Breastfeeding No     PHYSICAL EXAM  Physical Exam   Constitutional: She is oriented to person, place, and time. She  appears well-developed.   HENT:   Head: Normocephalic and atraumatic.   Nose: Nose normal.   Eyes: Lids are normal. Right eye exhibits no discharge. Left eye exhibits no discharge. Right conjunctiva is not injected. Left conjunctiva is not injected.   Pulmonary/Chest:  No respiratory distress.  Neurological: She is alert and oriented to person, place, and time. No cranial nerve deficit.   Psychiatric: She has a normal mood and affect. Her speech is normal and behavior is normal. Judgment and thought content normal.       Results for orders placed or performed in visit on 03/08/24   CBC (No Diff)    Collection Time: 03/08/24 10:44 AM    Specimen: Blood   Result Value Ref Range    WBC 6.29 3.40 - 10.80 10*3/mm3    RBC 3.86 3.77 - 5.28 10*6/mm3    Hemoglobin 12.0 12.0 - 15.9 g/dL    Hematocrit 37.6 34.0 - 46.6 %    MCV 97.4 (H) 79.0 - 97.0 fL    MCH 31.1 26.6 - 33.0 pg    MCHC 31.9 31.5 - 35.7 g/dL    RDW 13.7 12.3 - 15.4 %    RDW-SD 49.0 37.0 - 54.0 fl    MPV 10.9 6.0 - 12.0 fL    Platelets 185 140 - 450 10*3/mm3   Comprehensive Metabolic Panel    Collection Time: 03/08/24 10:44 AM    Specimen: Blood   Result Value Ref Range    Glucose 104 (H) 65 - 99 mg/dL    BUN 14 8 - 23 mg/dL    Creatinine 0.86 0.57 - 1.00 mg/dL    Sodium 139 136 - 145 mmol/L    Potassium 4.3 3.5 - 5.2 mmol/L    Chloride 101 98 - 107 mmol/L    CO2 27.0 22.0 - 29.0 mmol/L    Calcium 9.1 8.6 - 10.5 mg/dL    Total Protein 6.9 6.0 - 8.5 g/dL    Albumin 4.2 3.5 - 5.2 g/dL    ALT (SGPT) 17 1 - 33 U/L    AST (SGOT) 23 1 - 32 U/L    Alkaline Phosphatase 92 39 - 117 U/L    Total Bilirubin 0.4 0.0 - 1.2 mg/dL    Globulin 2.7 gm/dL    A/G Ratio 1.6 g/dL    BUN/Creatinine Ratio 16.3 7.0 - 25.0    Anion Gap 11.0 5.0 - 15.0 mmol/L    eGFR 75.1 >60.0 mL/min/1.73   Hemoglobin A1c    Collection Time: 03/08/24 10:44 AM    Specimen: Blood   Result Value Ref Range    Hemoglobin A1C 5.20 4.80 - 5.60 %   Lipid Panel    Collection Time: 03/08/24 10:44 AM    Specimen:  Blood   Result Value Ref Range    Total Cholesterol 261 (H) 0 - 200 mg/dL    Triglycerides 294 (H) 0 - 150 mg/dL    HDL Cholesterol 68 (H) 40 - 60 mg/dL    LDL Cholesterol  140 (H) 0 - 100 mg/dL    VLDL Cholesterol 53 (H) 5 - 40 mg/dL    LDL/HDL Ratio 1.97        Diagnoses and all orders for this visit:    1. Bronchitis (Primary)    Other orders  -     methylPREDNISolone (MEDROL) 4 MG dose pack; Take as directed on package instructions.  Dispense: 21 tablet; Refill: 0    Take prednisone with food as early in the day as possible  Do not take prednisone with nsaids such as ibuprofen, aleve, or aspirin  May take tylenol for pain or fever  Continue Mucinex with plenty of fluids especially water to thin secretions to help clear chest congestion   Albuterol inhaler as needed for shortness of breath and wheezing  Patient will use over-the-counter cough medication as needed   Hydrate well  Try gargling with warm salt water to help with tickle in throat  Hot tea lemon and honey    FOLLOW-UP  If symptoms worsen or persist follow up with PCP, Virtual Care or Urgent Care    Patient verbalizes understanding of medication dosage, comfort measures, instructions for treatment and follow-up.    Myranda Arias, APRN  01/07/2025  13:46 EST    The use of a video visit has been reviewed with the patient and verbal informed consent has been obtained. Myself and Melony Rincon participated in this visit. The patient is located in [address] [place] [address].    I am located in [place], [place]. [employer] and [employer] were utilized. I spent 23 minutes in the patient's chart for this visit.

## 2025-03-12 ENCOUNTER — LAB (OUTPATIENT)
Dept: LAB | Facility: HOSPITAL | Age: 65
End: 2025-03-12
Payer: MEDICARE

## 2025-03-12 ENCOUNTER — HOSPITAL ENCOUNTER (OUTPATIENT)
Dept: GENERAL RADIOLOGY | Facility: HOSPITAL | Age: 65
Discharge: HOME | End: 2025-03-12
Payer: MEDICARE

## 2025-03-12 ENCOUNTER — OFFICE VISIT (OUTPATIENT)
Dept: INTERNAL MEDICINE | Facility: CLINIC | Age: 65
End: 2025-03-12
Payer: MEDICARE

## 2025-03-12 VITALS
OXYGEN SATURATION: 96 % | BODY MASS INDEX: 25.91 KG/M2 | DIASTOLIC BLOOD PRESSURE: 82 MMHG | WEIGHT: 146.2 LBS | SYSTOLIC BLOOD PRESSURE: 126 MMHG | HEART RATE: 74 BPM | HEIGHT: 63 IN | TEMPERATURE: 97.9 F

## 2025-03-12 DIAGNOSIS — G89.29 CHRONIC LEFT SHOULDER PAIN: ICD-10-CM

## 2025-03-12 DIAGNOSIS — Z12.31 ENCOUNTER FOR SCREENING MAMMOGRAM FOR MALIGNANT NEOPLASM OF BREAST: ICD-10-CM

## 2025-03-12 DIAGNOSIS — Z00.00 MEDICARE ANNUAL WELLNESS VISIT, SUBSEQUENT: ICD-10-CM

## 2025-03-12 DIAGNOSIS — I10 ESSENTIAL HYPERTENSION: ICD-10-CM

## 2025-03-12 DIAGNOSIS — H53.9 VISUAL DISTURBANCES: ICD-10-CM

## 2025-03-12 DIAGNOSIS — M25.512 CHRONIC LEFT SHOULDER PAIN: ICD-10-CM

## 2025-03-12 DIAGNOSIS — E66.3 OVERWEIGHT (BMI 25.0-29.9): ICD-10-CM

## 2025-03-12 DIAGNOSIS — E78.2 MIXED HYPERLIPIDEMIA: ICD-10-CM

## 2025-03-12 DIAGNOSIS — R91.8 MULTIPLE LUNG NODULES: Primary | ICD-10-CM

## 2025-03-12 DIAGNOSIS — G57.63: ICD-10-CM

## 2025-03-12 LAB
ALBUMIN SERPL-MCNC: 4.2 G/DL (ref 3.5–5.2)
ALBUMIN/GLOB SERPL: 1.7 G/DL
ALP SERPL-CCNC: 68 U/L (ref 39–117)
ALT SERPL W P-5'-P-CCNC: 12 U/L (ref 1–33)
ANION GAP SERPL CALCULATED.3IONS-SCNC: 10 MMOL/L (ref 5–15)
AST SERPL-CCNC: 18 U/L (ref 1–32)
BILIRUB SERPL-MCNC: 0.5 MG/DL (ref 0–1.2)
BUN SERPL-MCNC: 12 MG/DL (ref 8–23)
BUN/CREAT SERPL: 16 (ref 7–25)
CALCIUM SPEC-SCNC: 9.2 MG/DL (ref 8.6–10.5)
CHLORIDE SERPL-SCNC: 104 MMOL/L (ref 98–107)
CHOLEST SERPL-MCNC: 226 MG/DL (ref 0–200)
CO2 SERPL-SCNC: 27 MMOL/L (ref 22–29)
CREAT SERPL-MCNC: 0.75 MG/DL (ref 0.57–1)
DEPRECATED RDW RBC AUTO: 43.4 FL (ref 37–54)
EGFRCR SERPLBLD CKD-EPI 2021: 87.9 ML/MIN/1.73
ERYTHROCYTE [DISTWIDTH] IN BLOOD BY AUTOMATED COUNT: 13.2 % (ref 12.3–15.4)
GLOBULIN UR ELPH-MCNC: 2.5 GM/DL
GLUCOSE SERPL-MCNC: 97 MG/DL (ref 65–99)
HBA1C MFR BLD: 6.2 % (ref 4.8–5.6)
HCT VFR BLD AUTO: 38.3 % (ref 34–46.6)
HDLC SERPL-MCNC: 60 MG/DL (ref 40–60)
HGB BLD-MCNC: 12.2 G/DL (ref 12–15.9)
LDLC SERPL CALC-MCNC: 136 MG/DL (ref 0–100)
LDLC/HDLC SERPL: 2.21 {RATIO}
MCH RBC QN AUTO: 28.7 PG (ref 26.6–33)
MCHC RBC AUTO-ENTMCNC: 31.9 G/DL (ref 31.5–35.7)
MCV RBC AUTO: 90.1 FL (ref 79–97)
PLATELET # BLD AUTO: 198 10*3/MM3 (ref 140–450)
PMV BLD AUTO: 11.9 FL (ref 6–12)
POTASSIUM SERPL-SCNC: 4.4 MMOL/L (ref 3.5–5.2)
PROT SERPL-MCNC: 6.7 G/DL (ref 6–8.5)
RBC # BLD AUTO: 4.25 10*6/MM3 (ref 3.77–5.28)
SODIUM SERPL-SCNC: 141 MMOL/L (ref 136–145)
TRIGL SERPL-MCNC: 168 MG/DL (ref 0–150)
VLDLC SERPL-MCNC: 30 MG/DL (ref 5–40)
WBC NRBC COR # BLD AUTO: 4.26 10*3/MM3 (ref 3.4–10.8)

## 2025-03-12 PROCEDURE — 85027 COMPLETE CBC AUTOMATED: CPT

## 2025-03-12 PROCEDURE — 36415 COLL VENOUS BLD VENIPUNCTURE: CPT

## 2025-03-12 PROCEDURE — 83036 HEMOGLOBIN GLYCOSYLATED A1C: CPT

## 2025-03-12 PROCEDURE — 80061 LIPID PANEL: CPT

## 2025-03-12 PROCEDURE — 73030 X-RAY EXAM OF SHOULDER: CPT

## 2025-03-12 PROCEDURE — 80053 COMPREHEN METABOLIC PANEL: CPT

## 2025-03-12 NOTE — PROGRESS NOTES
Subjective   The ABCs of the Annual Wellness Visit  Medicare Wellness Visit      Melony Rincon is a 64 y.o. patient who presents for a Medicare Wellness Visit.      The following portions of the patient's history were reviewed and   updated as appropriate: allergies, current medications, past family history, past medical history, past social history, past surgical history, and problem list.    Compared to one year ago, the patient's physical   health is worse. Weaker  Compared to one year ago, the patient's mental   health is the same.    Recent Hospitalizations:  She was not admitted to the hospital during the last year.     Current Medical Providers:  Patient Care Team:  ANDRÉS Louis MD as PCP - General (Internal Medicine)  Marilyn Davies APRN as Nurse Practitioner (Family Medicine)    Outpatient Medications Prior to Visit   Medication Sig Dispense Refill    albuterol sulfate  (90 Base) MCG/ACT inhaler 2 PUFFS EVERY 4-6 HOURS AS NEEDED FOR WHEEZING FOR 15 DAYS      aspirin 81 MG EC tablet Take 1 tablet by mouth Daily.      carvedilol (COREG) 12.5 MG tablet TAKE 1 TABLET BY MOUTH TWICE A DAY WITH MEALS 180 tablet 3    fluticasone (Flonase) 50 MCG/ACT nasal spray 2 sprays into the nostril(s) as directed by provider Daily. 16 g 0    ibuprofen (ADVIL,MOTRIN) 200 MG tablet Take 3 tablets by mouth As Needed for Mild Pain.      lansoprazole (PREVACID) 30 MG capsule Take 1 capsule by mouth Daily. 90 capsule 3    lisinopril (PRINIVIL,ZESTRIL) 10 MG tablet Take 1 tablet by mouth Daily. 90 tablet 3    rosuvastatin (CRESTOR) 5 MG tablet Take 1 tablet by mouth Daily. 90 tablet 3    methylPREDNISolone (MEDROL) 4 MG dose pack Take as directed on package instructions. 21 tablet 0     No facility-administered medications prior to visit.     No opioid medication identified on active medication list. I have reviewed chart for other potential  high risk medication/s and harmful drug interactions in the  "elderly.      Aspirin is on active medication list. Aspirin use is indicated based on review of current medical condition/s. Pros and cons of this therapy have been discussed today. Benefits of this medication outweigh potential harm.  Patient has been encouraged to continue taking this medication.  .      Patient Active Problem List   Diagnosis    Essential hypertension    Mixed hyperlipidemia    Former smoker    GERD (gastroesophageal reflux disease)    Tobacco use disorder, moderate, in sustained remission    Acute renal failure    Thrombocytopenia    Ovarian cyst    Transaminitis     Advance Care Planning Advance Directive is not on file.  ACP discussion was held with the patient during this visit. Patient does not have an advance directive, declines further assistance.            Objective   Vitals:    25 0904   BP: 126/82   BP Location: Left arm   Patient Position: Sitting   Cuff Size: Adult   Pulse: 74   Temp: 97.9 °F (36.6 °C)   TempSrc: Temporal   SpO2: 96%   Weight: 66.3 kg (146 lb 3.2 oz)   Height: 160 cm (63\")       Estimated body mass index is 25.9 kg/m² as calculated from the following:    Height as of this encounter: 160 cm (63\").    Weight as of this encounter: 66.3 kg (146 lb 3.2 oz).    BMI is >= 25 and <30. (Overweight) The following options were offered after discussion;: weight loss educational material (shared in after visit summary) and exercise counseling/recommendations           Does the patient have evidence of cognitive impairment? No                                                                                               Health  Risk Assessment    Smoking Status:  Social History     Tobacco Use   Smoking Status Former    Current packs/day: 0.00    Average packs/day: 2.0 packs/day for 20.0 years (40.0 ttl pk-yrs)    Types: Cigarettes    Start date:     Quit date:     Years since quittin.2   Smokeless Tobacco Never     Alcohol Consumption:  Social History "     Substance and Sexual Activity   Alcohol Use Yes    Alcohol/week: 2.0 standard drinks of alcohol    Types: 2 Glasses of wine per week    Comment: >3 times a week       Fall Risk Screen  STEADI Fall Risk Assessment was completed, and patient is at LOW risk for falls.Assessment completed on:3/12/2025    Depression Screening   Little interest or pleasure in doing things? Not at all   Feeling down, depressed, or hopeless? Not at all   PHQ-2 Total Score 0      Health Habits and Functional and Cognitive Screening:      3/5/2025     9:17 AM   Functional & Cognitive Status   Do you have difficulty preparing food and eating? No   Do you have difficulty bathing yourself, getting dressed or grooming yourself? No   Do you have difficulty using the toilet? No   Do you have difficulty moving around from place to place? No   Do you have trouble with steps or getting out of a bed or a chair? No   Current Diet Well Balanced Diet   Dental Exam Up to date   Eye Exam Up to date   Exercise (times per week) 1 times per week   Current Exercises Include House Cleaning   Do you need help using the phone?  No   Are you deaf or do you have serious difficulty hearing?  No   Do you need help to go to places out of walking distance? No   Do you need help shopping? No   Do you need help preparing meals?  No   Do you need help with housework?  No   Do you need help with laundry? No   Do you need help taking your medications? No   Do you need help managing money? No   Do you ever drive or ride in a car without wearing a seat belt? No   Have you felt unusual stress, anger or loneliness in the last month? No   Who do you live with? Spouse   If you need help, do you have trouble finding someone available to you? No   Have you been bothered in the last four weeks by sexual problems? No   Do you have difficulty concentrating, remembering or making decisions? No           Age-appropriate Screening Schedule:  Refer to the list below for future  screening recommendations based on patient's age, sex and/or medical conditions. Orders for these recommended tests are listed in the plan section. The patient has been provided with a written plan.    Health Maintenance List  Health Maintenance   Topic Date Due    Pneumococcal Vaccine 50+ (1 of 1 - PCV) Never done    ZOSTER VACCINE (1 of 2) Never done    DXA SCAN  05/17/2024    BMI FOLLOWUP  02/23/2025    ANNUAL WELLNESS VISIT  03/08/2025    LIPID PANEL  03/08/2025    COVID-19 Vaccine (9 - 2024-25 season) 04/29/2025    MAMMOGRAM  06/20/2025    COLORECTAL CANCER SCREENING  03/30/2026    TDAP/TD VACCINES (2 - Td or Tdap) 02/25/2027    HEPATITIS C SCREENING  Completed    INFLUENZA VACCINE  Completed                                                                                                                                                CMS Preventative Services Quick Reference  Risk Factors Identified During Encounter  Immunizations Discussed/Encouraged: Prevnar 20 (Pneumococcal 20-valent conjugate)  Polypharmacy: Medication List reviewed    The above risks/problems have been discussed with the patient.  Pertinent information has been shared with the patient in the After Visit Summary.  An After Visit Summary and PPPS were made available to the patient.    Follow Up:   Next Medicare Wellness visit to be scheduled in 1 year.     Assessment & Plan  Medicare annual wellness visit, subsequent    Orders:    CBC (No Diff); Future    Comprehensive Metabolic Panel; Future    Hemoglobin A1c; Future    Lipid Panel; Future    Essential hypertension      Orders:    Comprehensive Metabolic Panel; Future    Mixed hyperlipidemia       Orders:    Lipid Panel; Future    Multiple lung nodules    Orders:    CT Chest Without Contrast; Future    Overweight (BMI 25.0-29.9)  Patient's (Body mass index is 25.9 kg/m².) indicates that they are overweight with health conditions that include hypertension and dyslipidemias . Weight is  unchanged. BMI is above average; BMI management plan is completed. We discussed increasing exercise.          Encounter for screening mammogram for malignant neoplasm of breast    Orders:    Mammo Screening Digital Tomosynthesis Bilateral With CAD; Future    Chronic left shoulder pain    Orders:    XR Shoulder 2+ View Left; Future    Ambulatory Referral to Physical Therapy for Evaluation & Treatment    Visual disturbances    Orders:    Ambulatory Referral to Ophthalmology    Reinoso neuroma of both feet              Follow Up:   Return in about 1 year (around 3/12/2026) for Medicare Wellness.

## 2025-03-12 NOTE — PROGRESS NOTES
"Chief Complaint   Patient presents with    Medicare Wellness-subsequent         History:  Melony Rincon is a 64 y.o. female who presents today for evaluation of the above problems.      HPI  History of Present Illness  The patient presents for a Medicare visit as well as evaluation of left shoulder pain, visual disturbances, and bilateral foot pain.    She has been experiencing persistent left shoulder pain for the past 3 months, with no identifiable cause. The onset of pain was sudden, initially improving after a few days but subsequently recurring and becoming chronic. The pain is severe enough to disrupt her sleep, although it appears to be less intense during the day. She recalls an incident where she dropped her phone and experienced a sharp pain upon reaching out to catch it. Despite taking Motrin at night, which provides some relief, the pain remains significant enough to prevent her from sleeping. She also remembers a day when she was carrying several items without feeling strained, but noticed weakness in her arm after setting them down. The pain has impacted her daily activities, including bathing, hair care, and housework. She is right-handed. Initially, the area was tender to touch, but over the past 2 to 3 days, there has been a slight improvement in her range of motion. However, the pain intensifies when she lies down, radiating down her arm. She is hesitant about undergoing physical therapy due to concerns about potential pain and scar tissue formation. She had previously attended physical therapy for neck issues last year, where traction was performed by her chiropractor, providing some relief.    She reports experiencing visual disturbances, which she describes as \"auras,\" occurring three times in the past month, with the first episode happening six months ago. These episodes are characterized by jagged movement around the perimeter of her eye, obscuring her vision. One such episode occurred while " she was reading a receipt three weeks ago, causing her to miss a number. Another episode happened suddenly while she was driving six months ago. She consulted her optometrist in December but forgot to mention these episodes as they had only occurred once before. The episodes are brief but bothersome, often requiring her to lie down. She reports no associated headaches. Her vision has declined over the past year, necessitating constant use of glasses, which were slightly adjusted during her last visit to the optometrist in December. Her eyes were dilated during this visit.    She experiences pain at the bottom of both feet, which she attributes to frequently being barefoot or wearing slippers at home. She describes the pain as aching and burning, accompanied by a sensation of a hard knot in both feet. The pain is absent when she is off her feet but intensifies after standing for approximately 30 minutes, forcing her to shift her weight to the other foot, which then begins to hurt in the same spot. She is concerned about the potential impact of this pain on her ability to walk as the weather warms up. She notes that the pain is absent when she wears shoes, although her usual footwear lacks cushioning.      ROS:  Review of Systems   Eyes:  Positive for visual disturbance.   Musculoskeletal:  Positive for arthralgias.         Current Outpatient Medications:     albuterol sulfate  (90 Base) MCG/ACT inhaler, 2 PUFFS EVERY 4-6 HOURS AS NEEDED FOR WHEEZING FOR 15 DAYS, Disp: , Rfl:     aspirin 81 MG EC tablet, Take 1 tablet by mouth Daily., Disp: , Rfl:     carvedilol (COREG) 12.5 MG tablet, TAKE 1 TABLET BY MOUTH TWICE A DAY WITH MEALS, Disp: 180 tablet, Rfl: 3    fluticasone (Flonase) 50 MCG/ACT nasal spray, 2 sprays into the nostril(s) as directed by provider Daily., Disp: 16 g, Rfl: 0    ibuprofen (ADVIL,MOTRIN) 200 MG tablet, Take 3 tablets by mouth As Needed for Mild Pain., Disp: , Rfl:     lansoprazole  (PREVACID) 30 MG capsule, Take 1 capsule by mouth Daily., Disp: 90 capsule, Rfl: 3    lisinopril (PRINIVIL,ZESTRIL) 10 MG tablet, Take 1 tablet by mouth Daily., Disp: 90 tablet, Rfl: 3    rosuvastatin (CRESTOR) 5 MG tablet, Take 1 tablet by mouth Daily., Disp: 90 tablet, Rfl: 3    Lab Results   Component Value Date    GLUCOSE 104 (H) 03/08/2024    BUN 14 03/08/2024    CREATININE 0.86 03/08/2024     03/08/2024    K 4.3 03/08/2024     03/08/2024    CALCIUM 9.1 03/08/2024    PROTEINTOT 6.9 03/08/2024    ALBUMIN 4.2 03/08/2024    ALT 17 03/08/2024    AST 23 03/08/2024    ALKPHOS 92 03/08/2024    BILITOT 0.4 03/08/2024    GLOB 2.7 03/08/2024    AGRATIO 1.6 03/08/2024    BCR 16.3 03/08/2024    ANIONGAP 11.0 03/08/2024    EGFR 75.1 03/08/2024       WBC   Date Value Ref Range Status   03/08/2024 6.29 3.40 - 10.80 10*3/mm3 Final     RBC   Date Value Ref Range Status   03/08/2024 3.86 3.77 - 5.28 10*6/mm3 Final     Hemoglobin   Date Value Ref Range Status   03/08/2024 12.0 12.0 - 15.9 g/dL Final     Hematocrit   Date Value Ref Range Status   03/08/2024 37.6 34.0 - 46.6 % Final     MCV   Date Value Ref Range Status   03/08/2024 97.4 (H) 79.0 - 97.0 fL Final     MCH   Date Value Ref Range Status   03/08/2024 31.1 26.6 - 33.0 pg Final     MCHC   Date Value Ref Range Status   03/08/2024 31.9 31.5 - 35.7 g/dL Final     RDW   Date Value Ref Range Status   03/08/2024 13.7 12.3 - 15.4 % Final     RDW-SD   Date Value Ref Range Status   03/08/2024 49.0 37.0 - 54.0 fl Final     MPV   Date Value Ref Range Status   03/08/2024 10.9 6.0 - 12.0 fL Final     Platelets   Date Value Ref Range Status   03/08/2024 185 140 - 450 10*3/mm3 Final     Neutrophil %   Date Value Ref Range Status   09/18/2023 50.9 42.7 - 76.0 % Final     Lymphocyte %   Date Value Ref Range Status   09/18/2023 39.8 19.6 - 45.3 % Final     Monocyte %   Date Value Ref Range Status   09/18/2023 5.0 5.0 - 12.0 % Final     Eosinophil %   Date Value Ref Range Status  "  09/18/2023 3.2 0.3 - 6.2 % Final     Basophil %   Date Value Ref Range Status   09/18/2023 0.7 0.0 - 1.5 % Final     Immature Grans %   Date Value Ref Range Status   09/18/2023 0.4 0.0 - 0.5 % Final     Neutrophils, Absolute   Date Value Ref Range Status   09/18/2023 2.87 1.70 - 7.00 10*3/mm3 Final     Lymphocytes, Absolute   Date Value Ref Range Status   09/18/2023 2.24 0.70 - 3.10 10*3/mm3 Final     Monocytes, Absolute   Date Value Ref Range Status   09/18/2023 0.28 0.10 - 0.90 10*3/mm3 Final     Eosinophils, Absolute   Date Value Ref Range Status   09/18/2023 0.18 0.00 - 0.40 10*3/mm3 Final     Basophils, Absolute   Date Value Ref Range Status   09/18/2023 0.04 0.00 - 0.20 10*3/mm3 Final     Immature Grans, Absolute   Date Value Ref Range Status   09/18/2023 0.02 0.00 - 0.05 10*3/mm3 Final     nRBC   Date Value Ref Range Status   09/18/2023 0.0 0.0 - 0.2 /100 WBC Final         OBJECTIVE:  Visit Vitals  /82 (BP Location: Left arm, Patient Position: Sitting, Cuff Size: Adult)   Pulse 74   Temp 97.9 °F (36.6 °C) (Temporal)   Ht 160 cm (63\")   Wt 66.3 kg (146 lb 3.2 oz)   LMP  (LMP Unknown)   SpO2 96%   BMI 25.90 kg/m²      Physical Exam  Vitals and nursing note reviewed.   Constitutional:       General: She is not in acute distress.     Appearance: Normal appearance. She is well-developed. She is not ill-appearing or toxic-appearing.   HENT:      Head: Normocephalic and atraumatic.      Right Ear: Tympanic membrane, ear canal and external ear normal. There is no impacted cerumen.      Left Ear: Tympanic membrane, ear canal and external ear normal. There is no impacted cerumen.   Eyes:      General: No scleral icterus.     Conjunctiva/sclera: Conjunctivae normal.      Pupils: Pupils are equal, round, and reactive to light.   Neck:      Thyroid: No thyromegaly.      Vascular: No JVD.   Cardiovascular:      Rate and Rhythm: Normal rate and regular rhythm.      Heart sounds: Normal heart sounds. No murmur " heard.     No friction rub. No gallop.   Pulmonary:      Effort: Pulmonary effort is normal. No respiratory distress.      Breath sounds: Normal breath sounds. No stridor. No wheezing, rhonchi or rales.   Abdominal:      General: Abdomen is flat. There is no distension.      Palpations: Abdomen is soft.      Tenderness: There is no abdominal tenderness.   Musculoskeletal:      Right lower leg: No edema.      Left lower leg: No edema.        Feet:       Comments: Tenderness to palpation of left AC joint.  No weakness   Skin:     General: Skin is warm and dry.      Coloration: Skin is not jaundiced.   Neurological:      Mental Status: She is alert.      Cranial Nerves: No cranial nerve deficit.   Psychiatric:         Mood and Affect: Mood normal.         Behavior: Behavior normal.         Thought Content: Thought content normal.         Judgment: Judgment normal.           Assessment/Plan      Diagnoses and all orders for this visit:    1. Multiple lung nodules (Primary)  -     CT Chest Without Contrast; Future    2. Medicare annual wellness visit, subsequent  -     CBC (No Diff); Future  -     Comprehensive Metabolic Panel; Future  -     Hemoglobin A1c; Future  -     Lipid Panel; Future    3. Essential hypertension  Assessment & Plan:             Orders:  -     Comprehensive Metabolic Panel; Future    4. Mixed hyperlipidemia  Assessment & Plan:              Orders:  -     Lipid Panel; Future    5. Overweight (BMI 25.0-29.9)    6. Encounter for screening mammogram for malignant neoplasm of breast  -     Mammo Screening Digital Tomosynthesis Bilateral With CAD; Future    7. Chronic left shoulder pain  -     XR Shoulder 2+ View Left; Future  -     Ambulatory Referral to Physical Therapy for Evaluation & Treatment    8. Visual disturbances  -     Ambulatory Referral to Ophthalmology    9. Reinoso neuroma of both feet      Assessment & Plan    See Medicare wellness visit note for details regarding that visit.  She is also  being seen for other issues.    Left shoulder pain.  The symptoms suggest a possible diagnosis of acromioclavicular joint arthritis. Physical therapy was discussed as a potential treatment option, and she will inform us of her decision following the x-ray results. An x-ray of the left shoulder will be ordered to evaluate for arthritis and other potential issues. If the x-ray shows any abnormalities, further imaging such as an MRI may be considered to rule out rotator cuff problems.  In the meantime, she can continue taking as needed ibuprofen to help with her symptoms.    Visual disturbances.  The symptoms could potentially be indicative of occipital migraines, which do not always present with headaches. A referral to an ophthalmologist will be made for a comprehensive eye examination, with particular attention to the retina. If the ophthalmologist finds no issues with the eyes, other causes will be considered.    Bilateral foot pain.  The symptoms suggest a possible diagnosis of Reinoso's neuroma or neuralgia. She was advised to wear shoes with good insoles at home to distribute the pressure across the foot. Information on Reinoso's neuralgia was provided. If the condition worsens, a referral to a podiatrist will be considered.     Lung nodules.  A CAT scan of the chest will be ordered to follow up on previously identified lung nodules. This follow-up is timely as it falls within the recommended 3 to 6-month window since the last evaluation in October.      Return in about 1 year (around 3/12/2026) for Medicare Wellness.      VICTORIA Louis MD  09:14 CDT  3/12/2025   Electronically signed    Patient or patient representative verbalized consent for the use of Ambient Listening during the visit with  ANDRÉS Louis MD for chart documentation. 3/12/2025  09:36 CDT

## 2025-03-13 DIAGNOSIS — M19.019 AC JOINT ARTHROPATHY: Primary | ICD-10-CM

## 2025-04-11 ENCOUNTER — HOSPITAL ENCOUNTER (OUTPATIENT)
Dept: CT IMAGING | Facility: HOSPITAL | Age: 65
Discharge: HOME | End: 2025-04-11
Payer: MEDICARE

## 2025-04-11 DIAGNOSIS — R91.8 MULTIPLE LUNG NODULES: ICD-10-CM

## 2025-04-11 PROCEDURE — 71250 CT THORAX DX C-: CPT

## 2025-06-23 ENCOUNTER — HOSPITAL ENCOUNTER (OUTPATIENT)
Dept: MAMMOGRAPHY | Facility: HOSPITAL | Age: 65
Discharge: HOME | End: 2025-06-23
Admitting: INTERNAL MEDICINE
Payer: MEDICARE

## 2025-06-23 DIAGNOSIS — Z12.31 ENCOUNTER FOR SCREENING MAMMOGRAM FOR MALIGNANT NEOPLASM OF BREAST: ICD-10-CM

## 2025-06-23 PROCEDURE — 77063 BREAST TOMOSYNTHESIS BI: CPT

## 2025-06-23 PROCEDURE — 77067 SCR MAMMO BI INCL CAD: CPT

## 2025-08-14 RX ORDER — LISINOPRIL 10 MG/1
10 TABLET ORAL DAILY
Qty: 90 TABLET | Refills: 3 | Status: SHIPPED | OUTPATIENT
Start: 2025-08-14